# Patient Record
Sex: FEMALE | Race: WHITE | NOT HISPANIC OR LATINO
[De-identification: names, ages, dates, MRNs, and addresses within clinical notes are randomized per-mention and may not be internally consistent; named-entity substitution may affect disease eponyms.]

---

## 2017-03-17 ENCOUNTER — TRANSCRIPTION ENCOUNTER (OUTPATIENT)
Age: 32
End: 2017-03-17

## 2017-04-15 ENCOUNTER — TRANSCRIPTION ENCOUNTER (OUTPATIENT)
Age: 32
End: 2017-04-15

## 2017-11-09 ENCOUNTER — RESULT REVIEW (OUTPATIENT)
Age: 32
End: 2017-11-09

## 2017-11-10 ENCOUNTER — OUTPATIENT (OUTPATIENT)
Dept: OUTPATIENT SERVICES | Facility: HOSPITAL | Age: 32
LOS: 1 days | Discharge: HOME | End: 2017-11-10

## 2017-11-10 DIAGNOSIS — Z01.411 ENCOUNTER FOR GYNECOLOGICAL EXAMINATION (GENERAL) (ROUTINE) WITH ABNORMAL FINDINGS: ICD-10-CM

## 2018-02-19 ENCOUNTER — EMERGENCY (EMERGENCY)
Facility: HOSPITAL | Age: 33
LOS: 0 days | Discharge: HOME | End: 2018-02-19
Attending: EMERGENCY MEDICINE | Admitting: EMERGENCY MEDICINE
Payer: OTHER MISCELLANEOUS

## 2018-02-19 VITALS — SYSTOLIC BLOOD PRESSURE: 130 MMHG | DIASTOLIC BLOOD PRESSURE: 113 MMHG

## 2018-02-19 VITALS — WEIGHT: 293 LBS

## 2018-02-19 DIAGNOSIS — M25.561 PAIN IN RIGHT KNEE: ICD-10-CM

## 2018-02-19 DIAGNOSIS — R29.6 REPEATED FALLS: ICD-10-CM

## 2018-02-19 LAB — GLUCOSE BLDC GLUCOMTR-MCNC: 145 MG/DL — HIGH (ref 70–99)

## 2018-02-19 PROCEDURE — 99283 EMERGENCY DEPT VISIT LOW MDM: CPT

## 2018-02-19 PROCEDURE — 93010 ELECTROCARDIOGRAM REPORT: CPT

## 2018-02-19 PROCEDURE — 73564 X-RAY EXAM KNEE 4 OR MORE: CPT | Mod: 26,LT

## 2018-02-19 RX ORDER — IBUPROFEN 200 MG
600 TABLET ORAL ONCE
Qty: 0 | Refills: 0 | Status: COMPLETED | OUTPATIENT
Start: 2018-02-19 | End: 2018-02-19

## 2018-02-19 RX ADMIN — Medication 600 MILLIGRAM(S): at 11:12

## 2018-02-19 NOTE — ED STATDOCS - PROGRESS NOTE DETAILS
Pt with some pain relief with motrin, states only has pain with deep palpation, minimal to no pain with flexion/extension of knee and walking, pt stable for dc

## 2018-02-19 NOTE — ED STATDOCS - NS_ ATTENDINGSCRIBEDETAILS _ED_A_ED_FT
Madi Benson DO (Attending): The history, relevant review of systems, past medical and surgical history, medical decision making, and physical examination was documented by the scribe in my presence and I attest to the accuracy of the documentation.

## 2018-02-19 NOTE — ED ADULT TRIAGE NOTE - CHIEF COMPLAINT QUOTE
pt states she had mechincal slip and fall on water in hallway, falling onto left knee, pt c/o left knee pain, pt denies hitting head no LOC, no anticoagulation therapy.

## 2018-02-19 NOTE — ED ADULT NURSE NOTE - CHPI ED SYMPTOMS NEG
no bleeding/no weakness/no confusion/no numbness/no tingling/no fever/no abrasion/no deformity/no loss of consciousness/no vomiting

## 2018-02-19 NOTE — ED STATDOCS - OBJECTIVE STATEMENT
33 y/o female presents to the ED s/p fall earlier today c/o left knee pain. Pt states she slipped due to water on the floor. Pt states she fell on the same knee two weeks ago. Denies head trauma, NVD, fever. On metformin 3x a day. 31 y/o female presents to the ED s/p fall earlier today c/o left knee pain. Pt states she slipped due to water on the floor. Pt states she fell on the same knee two weeks ago. Denies head trauma, no LOC, no NVD, fever. On metformin 3x a day.  No other complaints.  Ambulatory in ED without assistance.

## 2018-02-19 NOTE — ED STATDOCS - ATTENDING CONTRIBUTION TO CARE
I, Madi Benson DO,  performed the initial face to face bedside interview with this patient regarding history of present illness, review of symptoms and relevant past medical, social and family history.  I completed an independent physical examination.  I was the initial provider who evaluated this patient. I have signed out the follow up of any pending tests (i.e. labs, radiological studies) to the ACP.  I have communicated the patient’s plan of care and disposition with the ACP. I, Madi Benson DO,  performed the initial face to face bedside interview with this patient regarding history of present illness, review of symptoms and relevant past medical, social and family history.  I completed an independent physical examination.  I was the initial provider who evaluated this patient. I have discussed pending tests (i.e. labs, radiological studies) with the Resident.  I agree with the patient’s plan of care and disposition as noted by the Resident.

## 2018-02-19 NOTE — ED STATDOCS - MUSCULOSKELETAL, MLM
range of motion is not limited. +TTP proximal anterior tibia left knee. TTP along patellar tendon. No ecchymosis. No deformity. No crepitus. Negative valgus/ varus. Negative draw. Negative sag.

## 2018-04-06 ENCOUNTER — OUTPATIENT (OUTPATIENT)
Dept: OUTPATIENT SERVICES | Facility: HOSPITAL | Age: 33
LOS: 1 days | End: 2018-04-06
Payer: COMMERCIAL

## 2018-04-06 ENCOUNTER — APPOINTMENT (OUTPATIENT)
Dept: ULTRASOUND IMAGING | Facility: CLINIC | Age: 33
End: 2018-04-06
Payer: COMMERCIAL

## 2018-04-06 DIAGNOSIS — Z00.8 ENCOUNTER FOR OTHER GENERAL EXAMINATION: ICD-10-CM

## 2018-04-06 PROCEDURE — 76536 US EXAM OF HEAD AND NECK: CPT | Mod: 26

## 2018-04-06 PROCEDURE — 76536 US EXAM OF HEAD AND NECK: CPT

## 2018-05-22 ENCOUNTER — OUTPATIENT (OUTPATIENT)
Dept: OUTPATIENT SERVICES | Facility: HOSPITAL | Age: 33
LOS: 1 days | Discharge: HOME | End: 2018-05-22
Payer: COMMERCIAL

## 2018-05-22 DIAGNOSIS — Z33.3 PREGNANT STATE, GESTATIONAL CARRIER: ICD-10-CM

## 2018-05-22 PROCEDURE — 83020 HEMOGLOBIN ELECTROPHORESIS: CPT | Mod: 26

## 2018-06-05 ENCOUNTER — OUTPATIENT (OUTPATIENT)
Dept: OUTPATIENT SERVICES | Facility: HOSPITAL | Age: 33
LOS: 1 days | Discharge: HOME | End: 2018-06-05

## 2018-06-05 DIAGNOSIS — Z33.3 PREGNANT STATE, GESTATIONAL CARRIER: ICD-10-CM

## 2018-06-09 LAB — VZV DNA, PCR RESULT: NEGATIVE — SIGNIFICANT CHANGE UP

## 2018-06-27 ENCOUNTER — APPOINTMENT (OUTPATIENT)
Dept: ANTEPARTUM | Facility: CLINIC | Age: 33
End: 2018-06-27
Payer: COMMERCIAL

## 2018-06-27 ENCOUNTER — ASOB RESULT (OUTPATIENT)
Age: 33
End: 2018-06-27

## 2018-06-27 PROCEDURE — 76813 OB US NUCHAL MEAS 1 GEST: CPT

## 2018-06-27 PROCEDURE — 36416 COLLJ CAPILLARY BLOOD SPEC: CPT

## 2018-06-27 PROCEDURE — 76801 OB US < 14 WKS SINGLE FETUS: CPT

## 2018-08-14 ENCOUNTER — ASOB RESULT (OUTPATIENT)
Age: 33
End: 2018-08-14

## 2018-08-14 ENCOUNTER — APPOINTMENT (OUTPATIENT)
Dept: ANTEPARTUM | Facility: CLINIC | Age: 33
End: 2018-08-14
Payer: COMMERCIAL

## 2018-08-14 PROCEDURE — 76817 TRANSVAGINAL US OBSTETRIC: CPT

## 2018-08-14 PROCEDURE — 76811 OB US DETAILED SNGL FETUS: CPT

## 2018-08-21 ENCOUNTER — APPOINTMENT (OUTPATIENT)
Dept: ANTEPARTUM | Facility: CLINIC | Age: 33
End: 2018-08-21
Payer: COMMERCIAL

## 2018-08-21 ENCOUNTER — ASOB RESULT (OUTPATIENT)
Age: 33
End: 2018-08-21

## 2018-08-21 PROCEDURE — 76816 OB US FOLLOW-UP PER FETUS: CPT

## 2018-09-11 ENCOUNTER — ASOB RESULT (OUTPATIENT)
Age: 33
End: 2018-09-11

## 2018-09-11 ENCOUNTER — APPOINTMENT (OUTPATIENT)
Dept: ANTEPARTUM | Facility: CLINIC | Age: 33
End: 2018-09-11
Payer: COMMERCIAL

## 2018-09-11 PROCEDURE — 76816 OB US FOLLOW-UP PER FETUS: CPT

## 2018-09-12 ENCOUNTER — ASOB RESULT (OUTPATIENT)
Age: 33
End: 2018-09-12

## 2018-09-12 ENCOUNTER — APPOINTMENT (OUTPATIENT)
Dept: ANTEPARTUM | Facility: CLINIC | Age: 33
End: 2018-09-12
Payer: COMMERCIAL

## 2018-09-12 PROCEDURE — 76827 ECHO EXAM OF FETAL HEART: CPT

## 2018-09-12 PROCEDURE — 76825 ECHO EXAM OF FETAL HEART: CPT

## 2018-09-26 ENCOUNTER — OUTPATIENT (OUTPATIENT)
Dept: OUTPATIENT SERVICES | Facility: HOSPITAL | Age: 33
LOS: 1 days | Discharge: HOME | End: 2018-09-26

## 2018-09-26 DIAGNOSIS — D64.9 ANEMIA, UNSPECIFIED: ICD-10-CM

## 2018-09-26 DIAGNOSIS — Z3A.25 25 WEEKS GESTATION OF PREGNANCY: ICD-10-CM

## 2018-10-01 ENCOUNTER — APPOINTMENT (OUTPATIENT)
Dept: OBGYN | Facility: CLINIC | Age: 33
End: 2018-10-01
Payer: COMMERCIAL

## 2018-10-01 VITALS — BODY MASS INDEX: 56.75 KG/M2 | HEIGHT: 65 IN

## 2018-10-01 VITALS — TEMPERATURE: 97.8 F | DIASTOLIC BLOOD PRESSURE: 90 MMHG | SYSTOLIC BLOOD PRESSURE: 140 MMHG | WEIGHT: 293 LBS

## 2018-10-01 DIAGNOSIS — Z20.2 CONTACT WITH AND (SUSPECTED) EXPOSURE TO INFECTIONS WITH A PREDOMINANTLY SEXUAL MODE OF TRANSMISSION: ICD-10-CM

## 2018-10-01 DIAGNOSIS — Z78.9 OTHER SPECIFIED HEALTH STATUS: ICD-10-CM

## 2018-10-01 PROCEDURE — 99203 OFFICE O/P NEW LOW 30 MIN: CPT

## 2018-10-02 PROBLEM — Z20.2 HPV EXPOSURE: Status: RESOLVED | Noted: 2018-10-01 | Resolved: 2018-10-02

## 2018-10-02 PROBLEM — Z78.9 NON-SMOKER: Status: ACTIVE | Noted: 2018-10-01

## 2018-10-04 ENCOUNTER — NON-APPOINTMENT (OUTPATIENT)
Age: 33
End: 2018-10-04

## 2018-10-04 ENCOUNTER — APPOINTMENT (OUTPATIENT)
Dept: OBGYN | Facility: CLINIC | Age: 33
End: 2018-10-04
Payer: COMMERCIAL

## 2018-10-04 VITALS
HEIGHT: 65 IN | BODY MASS INDEX: 48.82 KG/M2 | DIASTOLIC BLOOD PRESSURE: 82 MMHG | SYSTOLIC BLOOD PRESSURE: 140 MMHG | WEIGHT: 293 LBS

## 2018-10-04 PROCEDURE — 0501F PRENATAL FLOW SHEET: CPT

## 2018-10-08 LAB
BILIRUB UR QL STRIP: NORMAL
GLUCOSE UR-MCNC: NORMAL
HCG UR QL: 1 EU/DL
HGB UR QL STRIP.AUTO: NORMAL
KETONES UR-MCNC: NORMAL
LEUKOCYTE ESTERASE UR QL STRIP: NORMAL
NITRITE UR QL STRIP: NORMAL
PH UR STRIP: 6
PROT UR STRIP-MCNC: NORMAL
SP GR UR STRIP: 1.02

## 2018-10-09 ENCOUNTER — APPOINTMENT (OUTPATIENT)
Dept: ANTEPARTUM | Facility: CLINIC | Age: 33
End: 2018-10-09

## 2018-10-10 ENCOUNTER — APPOINTMENT (OUTPATIENT)
Dept: ANTEPARTUM | Facility: CLINIC | Age: 33
End: 2018-10-10
Payer: COMMERCIAL

## 2018-10-10 ENCOUNTER — ASOB RESULT (OUTPATIENT)
Age: 33
End: 2018-10-10

## 2018-10-10 PROCEDURE — 99213 OFFICE O/P EST LOW 20 MIN: CPT | Mod: 25

## 2018-10-10 PROCEDURE — 76816 OB US FOLLOW-UP PER FETUS: CPT

## 2018-10-10 PROCEDURE — 76820 UMBILICAL ARTERY ECHO: CPT

## 2018-10-11 ENCOUNTER — RX RENEWAL (OUTPATIENT)
Age: 33
End: 2018-10-11

## 2018-10-15 ENCOUNTER — NON-APPOINTMENT (OUTPATIENT)
Age: 33
End: 2018-10-15

## 2018-10-15 ENCOUNTER — APPOINTMENT (OUTPATIENT)
Dept: OBGYN | Facility: CLINIC | Age: 33
End: 2018-10-15
Payer: COMMERCIAL

## 2018-10-15 VITALS
DIASTOLIC BLOOD PRESSURE: 84 MMHG | HEIGHT: 65 IN | SYSTOLIC BLOOD PRESSURE: 140 MMHG | WEIGHT: 293 LBS | BODY MASS INDEX: 48.82 KG/M2

## 2018-10-15 PROCEDURE — 0502F SUBSEQUENT PRENATAL CARE: CPT

## 2018-10-15 PROCEDURE — 81003 URINALYSIS AUTO W/O SCOPE: CPT | Mod: QW

## 2018-10-15 PROCEDURE — 99213 OFFICE O/P EST LOW 20 MIN: CPT

## 2018-10-15 PROCEDURE — 82962 GLUCOSE BLOOD TEST: CPT

## 2018-10-17 LAB
BILIRUB UR QL STRIP: NORMAL
CLARITY UR: CLEAR
COLLECTION METHOD: NORMAL
GLUCOSE BLDC GLUCOMTR-MCNC: NORMAL
GLUCOSE UR-MCNC: NORMAL
HCG UR QL: 1 EU/DL
HGB UR QL STRIP.AUTO: NORMAL
KETONES UR-MCNC: NORMAL
LEUKOCYTE ESTERASE UR QL STRIP: NORMAL
NITRITE UR QL STRIP: NORMAL
PH UR STRIP: 7
PROT UR STRIP-MCNC: NORMAL
SP GR UR STRIP: 1.01

## 2018-10-19 ENCOUNTER — NON-APPOINTMENT (OUTPATIENT)
Age: 33
End: 2018-10-19

## 2018-10-26 ENCOUNTER — APPOINTMENT (OUTPATIENT)
Dept: OBGYN | Facility: CLINIC | Age: 33
End: 2018-10-26
Payer: COMMERCIAL

## 2018-10-26 VITALS — BODY MASS INDEX: 48.82 KG/M2 | HEIGHT: 65 IN | WEIGHT: 293 LBS

## 2018-10-26 VITALS — SYSTOLIC BLOOD PRESSURE: 140 MMHG | DIASTOLIC BLOOD PRESSURE: 81 MMHG

## 2018-10-26 PROCEDURE — 99212 OFFICE O/P EST SF 10 MIN: CPT

## 2018-10-26 PROCEDURE — 0502F SUBSEQUENT PRENATAL CARE: CPT

## 2018-11-06 ENCOUNTER — ASOB RESULT (OUTPATIENT)
Age: 33
End: 2018-11-06

## 2018-11-06 ENCOUNTER — APPOINTMENT (OUTPATIENT)
Dept: ANTEPARTUM | Facility: CLINIC | Age: 33
End: 2018-11-06
Payer: COMMERCIAL

## 2018-11-06 PROCEDURE — 76816 OB US FOLLOW-UP PER FETUS: CPT

## 2018-11-06 PROCEDURE — 76819 FETAL BIOPHYS PROFIL W/O NST: CPT

## 2018-11-09 ENCOUNTER — NON-APPOINTMENT (OUTPATIENT)
Age: 33
End: 2018-11-09

## 2018-11-09 ENCOUNTER — APPOINTMENT (OUTPATIENT)
Dept: OBGYN | Facility: CLINIC | Age: 33
End: 2018-11-09
Payer: COMMERCIAL

## 2018-11-09 VITALS
HEIGHT: 65 IN | TEMPERATURE: 98.7 F | DIASTOLIC BLOOD PRESSURE: 88 MMHG | SYSTOLIC BLOOD PRESSURE: 132 MMHG | WEIGHT: 293 LBS | BODY MASS INDEX: 48.82 KG/M2

## 2018-11-09 VITALS — SYSTOLIC BLOOD PRESSURE: 160 MMHG | DIASTOLIC BLOOD PRESSURE: 90 MMHG

## 2018-11-09 LAB
BILIRUB UR QL STRIP: NORMAL
CLARITY UR: CLEAR
COLLECTION METHOD: NORMAL
GLUCOSE UR-MCNC: NORMAL
HCG UR QL: 1 EU/DL
HGB UR QL STRIP.AUTO: NORMAL
KETONES UR-MCNC: NORMAL
LEUKOCYTE ESTERASE UR QL STRIP: NORMAL
NITRITE UR QL STRIP: NORMAL
PH UR STRIP: 6
PROT UR STRIP-MCNC: NORMAL
SP GR UR STRIP: 1.02

## 2018-11-09 PROCEDURE — 59025 FETAL NON-STRESS TEST: CPT

## 2018-11-09 PROCEDURE — 0502F SUBSEQUENT PRENATAL CARE: CPT | Mod: 25

## 2018-11-09 PROCEDURE — 81003 URINALYSIS AUTO W/O SCOPE: CPT | Mod: QW

## 2018-11-09 RX ORDER — METRONIDAZOLE 7.5 MG/G
0.75 GEL VAGINAL
Qty: 70 | Refills: 0 | Status: COMPLETED | COMMUNITY
Start: 2018-06-06 | End: 2018-11-09

## 2018-11-09 RX ORDER — TERCONAZOLE 4 MG/G
0.4 CREAM VAGINAL
Qty: 45 | Refills: 0 | Status: COMPLETED | COMMUNITY
Start: 2018-06-06 | End: 2018-11-09

## 2018-11-13 ENCOUNTER — APPOINTMENT (OUTPATIENT)
Dept: ANTEPARTUM | Facility: CLINIC | Age: 33
End: 2018-11-13
Payer: COMMERCIAL

## 2018-11-13 ENCOUNTER — ASOB RESULT (OUTPATIENT)
Age: 33
End: 2018-11-13

## 2018-11-13 LAB
BASOPHILS # BLD AUTO: 0.04 K/UL
BASOPHILS NFR BLD AUTO: 0.3 %
EOSINOPHIL # BLD AUTO: 0.15 K/UL
EOSINOPHIL NFR BLD AUTO: 1.2 %
HCT VFR BLD CALC: 33.4 %
HGB BLD-MCNC: 10.5 G/DL
IMM GRANULOCYTES NFR BLD AUTO: 0.4 %
LYMPHOCYTES # BLD AUTO: 1.68 K/UL
LYMPHOCYTES NFR BLD AUTO: 13.7 %
MAN DIFF?: NORMAL
MCHC RBC-ENTMCNC: 24.5 PG
MCHC RBC-ENTMCNC: 31.4 GM/DL
MCV RBC AUTO: 78 FL
MONOCYTES # BLD AUTO: 0.89 K/UL
MONOCYTES NFR BLD AUTO: 7.3 %
NEUTROPHILS # BLD AUTO: 9.42 K/UL
NEUTROPHILS NFR BLD AUTO: 77.1 %
PLATELET # BLD AUTO: 262 K/UL
RBC # BLD: 4.28 M/UL
RBC # FLD: 14.6 %
WBC # FLD AUTO: 12.23 K/UL

## 2018-11-13 PROCEDURE — 76818 FETAL BIOPHYS PROFILE W/NST: CPT

## 2018-11-14 LAB
ALBUMIN SERPL ELPH-MCNC: 3.6 G/DL
ALP BLD-CCNC: 89 U/L
ALT SERPL-CCNC: 14 U/L
ANION GAP SERPL CALC-SCNC: 12 MMOL/L
AST SERPL-CCNC: 17 U/L
BILIRUB SERPL-MCNC: 0.2 MG/DL
BUN SERPL-MCNC: 6 MG/DL
CALCIUM SERPL-MCNC: 9.2 MG/DL
CHLORIDE SERPL-SCNC: 101 MMOL/L
CO2 SERPL-SCNC: 23 MMOL/L
CREAT SERPL-MCNC: 0.35 MG/DL
CREAT SPEC-SCNC: 17 MG/DL
CREAT/PROT UR: NORMAL
GLUCOSE SERPL-MCNC: 98 MG/DL
POTASSIUM SERPL-SCNC: 4.1 MMOL/L
PROT SERPL-MCNC: 6.6 G/DL
PROT UR-MCNC: <4 MG/DL
SODIUM SERPL-SCNC: 136 MMOL/L
URATE SERPL-MCNC: 3.4 MG/DL

## 2018-11-16 ENCOUNTER — APPOINTMENT (OUTPATIENT)
Dept: OBGYN | Facility: CLINIC | Age: 33
End: 2018-11-16
Payer: COMMERCIAL

## 2018-11-16 VITALS
BODY MASS INDEX: 48.82 KG/M2 | SYSTOLIC BLOOD PRESSURE: 130 MMHG | HEIGHT: 65 IN | DIASTOLIC BLOOD PRESSURE: 80 MMHG | WEIGHT: 293 LBS

## 2018-11-16 PROCEDURE — 0502F SUBSEQUENT PRENATAL CARE: CPT

## 2018-11-16 PROCEDURE — 81003 URINALYSIS AUTO W/O SCOPE: CPT | Mod: QW

## 2018-11-16 PROCEDURE — 59025 FETAL NON-STRESS TEST: CPT

## 2018-11-19 ENCOUNTER — NON-APPOINTMENT (OUTPATIENT)
Age: 33
End: 2018-11-19

## 2018-11-19 LAB
BILIRUB UR QL STRIP: NORMAL
CLARITY UR: CLEAR
COLLECTION METHOD: NORMAL
GLUCOSE UR-MCNC: NORMAL
HCG UR QL: 0.2 EU/DL
HGB UR QL STRIP.AUTO: NORMAL
KETONES UR-MCNC: NORMAL
LEUKOCYTE ESTERASE UR QL STRIP: NORMAL
NITRITE UR QL STRIP: NORMAL
PH UR STRIP: 6
PROT UR STRIP-MCNC: NORMAL
SP GR UR STRIP: 1.02

## 2018-11-20 ENCOUNTER — APPOINTMENT (OUTPATIENT)
Dept: ANTEPARTUM | Facility: CLINIC | Age: 33
End: 2018-11-20
Payer: COMMERCIAL

## 2018-11-20 ENCOUNTER — ASOB RESULT (OUTPATIENT)
Age: 33
End: 2018-11-20

## 2018-11-20 PROCEDURE — 76818 FETAL BIOPHYS PROFILE W/NST: CPT

## 2018-11-21 ENCOUNTER — APPOINTMENT (OUTPATIENT)
Dept: OBGYN | Facility: CLINIC | Age: 33
End: 2018-11-21

## 2018-11-27 ENCOUNTER — APPOINTMENT (OUTPATIENT)
Dept: ANTEPARTUM | Facility: CLINIC | Age: 33
End: 2018-11-27
Payer: COMMERCIAL

## 2018-11-27 ENCOUNTER — ASOB RESULT (OUTPATIENT)
Age: 33
End: 2018-11-27

## 2018-11-27 PROCEDURE — 76818 FETAL BIOPHYS PROFILE W/NST: CPT

## 2018-11-30 ENCOUNTER — APPOINTMENT (OUTPATIENT)
Dept: OBGYN | Facility: CLINIC | Age: 33
End: 2018-11-30

## 2018-12-04 ENCOUNTER — APPOINTMENT (OUTPATIENT)
Dept: ANTEPARTUM | Facility: CLINIC | Age: 33
End: 2018-12-04
Payer: COMMERCIAL

## 2018-12-04 ENCOUNTER — ASOB RESULT (OUTPATIENT)
Age: 33
End: 2018-12-04

## 2018-12-04 PROCEDURE — 76818 FETAL BIOPHYS PROFILE W/NST: CPT

## 2018-12-04 PROCEDURE — 76816 OB US FOLLOW-UP PER FETUS: CPT

## 2018-12-08 ENCOUNTER — NON-APPOINTMENT (OUTPATIENT)
Age: 33
End: 2018-12-08

## 2018-12-08 ENCOUNTER — APPOINTMENT (OUTPATIENT)
Dept: OBGYN | Facility: CLINIC | Age: 33
End: 2018-12-08
Payer: COMMERCIAL

## 2018-12-08 VITALS
BODY MASS INDEX: 48.82 KG/M2 | DIASTOLIC BLOOD PRESSURE: 80 MMHG | HEIGHT: 65 IN | SYSTOLIC BLOOD PRESSURE: 138 MMHG | TEMPERATURE: 98 F | WEIGHT: 293 LBS

## 2018-12-08 LAB
BILIRUB UR QL STRIP: NORMAL
CLARITY UR: CLEAR
COLLECTION METHOD: NORMAL
GLUCOSE UR-MCNC: NORMAL
HCG UR QL: 0.2 EU/DL
HGB UR QL STRIP.AUTO: NORMAL
KETONES UR-MCNC: NORMAL
LEUKOCYTE ESTERASE UR QL STRIP: NORMAL
NITRITE UR QL STRIP: NORMAL
PH UR STRIP: 7
PROT UR STRIP-MCNC: NORMAL
SP GR UR STRIP: 1.01

## 2018-12-08 PROCEDURE — 81003 URINALYSIS AUTO W/O SCOPE: CPT | Mod: QW

## 2018-12-08 PROCEDURE — 59025 FETAL NON-STRESS TEST: CPT

## 2018-12-08 PROCEDURE — 0502F SUBSEQUENT PRENATAL CARE: CPT

## 2018-12-11 ENCOUNTER — ASOB RESULT (OUTPATIENT)
Age: 33
End: 2018-12-11

## 2018-12-11 ENCOUNTER — APPOINTMENT (OUTPATIENT)
Dept: ANTEPARTUM | Facility: CLINIC | Age: 33
End: 2018-12-11
Payer: COMMERCIAL

## 2018-12-11 PROCEDURE — 76818 FETAL BIOPHYS PROFILE W/NST: CPT

## 2018-12-14 ENCOUNTER — OTHER (OUTPATIENT)
Age: 33
End: 2018-12-14

## 2018-12-14 ENCOUNTER — APPOINTMENT (OUTPATIENT)
Dept: OBGYN | Facility: CLINIC | Age: 33
End: 2018-12-14
Payer: COMMERCIAL

## 2018-12-14 ENCOUNTER — NON-APPOINTMENT (OUTPATIENT)
Age: 33
End: 2018-12-14

## 2018-12-14 VITALS
DIASTOLIC BLOOD PRESSURE: 100 MMHG | TEMPERATURE: 97.6 F | HEIGHT: 65 IN | WEIGHT: 293 LBS | BODY MASS INDEX: 48.82 KG/M2 | SYSTOLIC BLOOD PRESSURE: 150 MMHG

## 2018-12-14 VITALS — SYSTOLIC BLOOD PRESSURE: 144 MMHG | DIASTOLIC BLOOD PRESSURE: 88 MMHG

## 2018-12-14 LAB
BILIRUB UR QL STRIP: NORMAL
CLARITY UR: CLEAR
COLLECTION METHOD: NORMAL
GLUCOSE UR-MCNC: NORMAL
HCG UR QL: 0.2 EU/DL
HGB UR QL STRIP.AUTO: NORMAL
KETONES UR-MCNC: NORMAL
LEUKOCYTE ESTERASE UR QL STRIP: NORMAL
NITRITE UR QL STRIP: NORMAL
PH UR STRIP: 6
PROT UR STRIP-MCNC: NORMAL
SP GR UR STRIP: 1.01

## 2018-12-14 PROCEDURE — 0502F SUBSEQUENT PRENATAL CARE: CPT

## 2018-12-14 PROCEDURE — 59025 FETAL NON-STRESS TEST: CPT

## 2018-12-18 LAB — B-HEM STREP SPEC QL CULT: ABNORMAL

## 2018-12-19 ENCOUNTER — ASOB RESULT (OUTPATIENT)
Age: 33
End: 2018-12-19

## 2018-12-19 ENCOUNTER — APPOINTMENT (OUTPATIENT)
Dept: ANTEPARTUM | Facility: CLINIC | Age: 33
End: 2018-12-19
Payer: COMMERCIAL

## 2018-12-19 PROCEDURE — 76818 FETAL BIOPHYS PROFILE W/NST: CPT

## 2018-12-21 ENCOUNTER — OUTPATIENT (OUTPATIENT)
Dept: OUTPATIENT SERVICES | Facility: HOSPITAL | Age: 33
LOS: 1 days | Discharge: HOME | End: 2018-12-21

## 2018-12-21 DIAGNOSIS — O34.211 MATERNAL CARE FOR LOW TRANSVERSE SCAR FROM PREVIOUS CESAREAN DELIVERY: ICD-10-CM

## 2018-12-21 LAB
BASOPHILS # BLD AUTO: 0.04 K/UL — SIGNIFICANT CHANGE UP (ref 0–0.2)
BASOPHILS NFR BLD AUTO: 0.4 % — SIGNIFICANT CHANGE UP (ref 0–2)
BLD GP AB SCN SERPL QL: SIGNIFICANT CHANGE UP
EOSINOPHIL # BLD AUTO: 0.09 K/UL — SIGNIFICANT CHANGE UP (ref 0–0.5)
EOSINOPHIL NFR BLD AUTO: 0.8 % — SIGNIFICANT CHANGE UP (ref 0–6)
HCT VFR BLD CALC: 31.9 % — LOW (ref 34.5–45)
HGB BLD-MCNC: 10.4 G/DL — LOW (ref 11.5–15.5)
IMM GRANULOCYTES NFR BLD AUTO: 0.5 % — SIGNIFICANT CHANGE UP (ref 0–1.5)
LYMPHOCYTES # BLD AUTO: 1.64 K/UL — SIGNIFICANT CHANGE UP (ref 1–3.3)
LYMPHOCYTES # BLD AUTO: 15.1 % — SIGNIFICANT CHANGE UP (ref 13–44)
MCHC RBC-ENTMCNC: 24.8 PG — LOW (ref 27–34)
MCHC RBC-ENTMCNC: 32.6 GM/DL — SIGNIFICANT CHANGE UP (ref 32–36)
MCV RBC AUTO: 76 FL — LOW (ref 80–100)
MONOCYTES # BLD AUTO: 0.77 K/UL — SIGNIFICANT CHANGE UP (ref 0–0.9)
MONOCYTES NFR BLD AUTO: 7.1 % — SIGNIFICANT CHANGE UP (ref 2–14)
NEUTROPHILS # BLD AUTO: 8.25 K/UL — HIGH (ref 1.8–7.4)
NEUTROPHILS NFR BLD AUTO: 76.1 % — SIGNIFICANT CHANGE UP (ref 43–77)
NRBC # BLD: 0 /100 WBCS — SIGNIFICANT CHANGE UP (ref 0–0)
PLATELET # BLD AUTO: 239 K/UL — SIGNIFICANT CHANGE UP (ref 150–400)
RBC # BLD: 4.2 M/UL — SIGNIFICANT CHANGE UP (ref 3.8–5.2)
RBC # FLD: 14.9 % — HIGH (ref 10.3–14.5)
TYPE + AB SCN PNL BLD: SIGNIFICANT CHANGE UP
WBC # BLD: 10.84 K/UL — HIGH (ref 3.8–10.5)
WBC # FLD AUTO: 10.84 K/UL — HIGH (ref 3.8–10.5)

## 2018-12-22 ENCOUNTER — RESULT REVIEW (OUTPATIENT)
Age: 33
End: 2018-12-22

## 2018-12-22 ENCOUNTER — INPATIENT (INPATIENT)
Facility: HOSPITAL | Age: 33
LOS: 2 days | Discharge: HOME | End: 2018-12-25
Attending: OBSTETRICS & GYNECOLOGY | Admitting: OBSTETRICS & GYNECOLOGY
Payer: COMMERCIAL

## 2018-12-22 VITALS — WEIGHT: 293 LBS | HEIGHT: 65 IN

## 2018-12-22 LAB
ALBUMIN SERPL ELPH-MCNC: 2.7 G/DL — LOW (ref 3.3–5)
ALP SERPL-CCNC: 92 U/L — SIGNIFICANT CHANGE UP (ref 40–120)
ALT FLD-CCNC: 24 U/L — SIGNIFICANT CHANGE UP (ref 12–78)
ANION GAP SERPL CALC-SCNC: 8 MMOL/L — SIGNIFICANT CHANGE UP (ref 5–17)
APTT BLD: 29.2 SEC — SIGNIFICANT CHANGE UP (ref 27.5–36.3)
AST SERPL-CCNC: 23 U/L — SIGNIFICANT CHANGE UP (ref 15–37)
BASOPHILS # BLD AUTO: 0.05 K/UL — SIGNIFICANT CHANGE UP (ref 0–0.2)
BASOPHILS NFR BLD AUTO: 0.4 % — SIGNIFICANT CHANGE UP (ref 0–2)
BILIRUB SERPL-MCNC: 0.3 MG/DL — SIGNIFICANT CHANGE UP (ref 0.2–1.2)
BUN SERPL-MCNC: 11 MG/DL — SIGNIFICANT CHANGE UP (ref 7–23)
CALCIUM SERPL-MCNC: 9 MG/DL — SIGNIFICANT CHANGE UP (ref 8.5–10.1)
CHLORIDE SERPL-SCNC: 108 MMOL/L — SIGNIFICANT CHANGE UP (ref 96–108)
CO2 SERPL-SCNC: 22 MMOL/L — SIGNIFICANT CHANGE UP (ref 22–31)
CREAT ?TM UR-MCNC: 68 MG/DL — SIGNIFICANT CHANGE UP
CREAT SERPL-MCNC: 0.47 MG/DL — LOW (ref 0.5–1.3)
EOSINOPHIL # BLD AUTO: 0.13 K/UL — SIGNIFICANT CHANGE UP (ref 0–0.5)
EOSINOPHIL NFR BLD AUTO: 1.1 % — SIGNIFICANT CHANGE UP (ref 0–6)
FIBRINOGEN PPP-MCNC: 828 MG/DL — HIGH (ref 330–470)
GLUCOSE BLDC GLUCOMTR-MCNC: 107 MG/DL — HIGH (ref 70–99)
GLUCOSE BLDC GLUCOMTR-MCNC: 98 MG/DL — SIGNIFICANT CHANGE UP (ref 70–99)
GLUCOSE SERPL-MCNC: 85 MG/DL — SIGNIFICANT CHANGE UP (ref 70–99)
HBV SURFACE AG SER-ACNC: SIGNIFICANT CHANGE UP
HCT VFR BLD CALC: 33.5 % — LOW (ref 34.5–45)
HGB BLD-MCNC: 10.6 G/DL — LOW (ref 11.5–15.5)
HIV 1 & 2 AB SERPL IA.RAPID: SIGNIFICANT CHANGE UP
IMM GRANULOCYTES NFR BLD AUTO: 0.4 % — SIGNIFICANT CHANGE UP (ref 0–1.5)
INR BLD: 0.94 RATIO — SIGNIFICANT CHANGE UP (ref 0.88–1.16)
LYMPHOCYTES # BLD AUTO: 1.75 K/UL — SIGNIFICANT CHANGE UP (ref 1–3.3)
LYMPHOCYTES # BLD AUTO: 14.4 % — SIGNIFICANT CHANGE UP (ref 13–44)
MAGNESIUM SERPL-MCNC: 3.4 MG/DL — HIGH (ref 1.6–2.6)
MCHC RBC-ENTMCNC: 24.5 PG — LOW (ref 27–34)
MCHC RBC-ENTMCNC: 31.6 GM/DL — LOW (ref 32–36)
MCV RBC AUTO: 77.4 FL — LOW (ref 80–100)
MONOCYTES # BLD AUTO: 0.9 K/UL — SIGNIFICANT CHANGE UP (ref 0–0.9)
MONOCYTES NFR BLD AUTO: 7.4 % — SIGNIFICANT CHANGE UP (ref 2–14)
NEUTROPHILS # BLD AUTO: 9.29 K/UL — HIGH (ref 1.8–7.4)
NEUTROPHILS NFR BLD AUTO: 76.3 % — SIGNIFICANT CHANGE UP (ref 43–77)
PLATELET # BLD AUTO: 246 K/UL — SIGNIFICANT CHANGE UP (ref 150–400)
POTASSIUM SERPL-MCNC: 3.8 MMOL/L — SIGNIFICANT CHANGE UP (ref 3.5–5.3)
POTASSIUM SERPL-SCNC: 3.8 MMOL/L — SIGNIFICANT CHANGE UP (ref 3.5–5.3)
PROT ?TM UR-MCNC: 11 MG/DL — SIGNIFICANT CHANGE UP (ref 0–12)
PROT SERPL-MCNC: 7.3 GM/DL — SIGNIFICANT CHANGE UP (ref 6–8.3)
PROT/CREAT UR-RTO: 0.2 RATIO — SIGNIFICANT CHANGE UP (ref 0–0.2)
PROTHROM AB SERPL-ACNC: 10.4 SEC — SIGNIFICANT CHANGE UP (ref 10–12.9)
RBC # BLD: 4.33 M/UL — SIGNIFICANT CHANGE UP (ref 3.8–5.2)
RBC # FLD: 14.9 % — HIGH (ref 10.3–14.5)
SODIUM SERPL-SCNC: 138 MMOL/L — SIGNIFICANT CHANGE UP (ref 135–145)
WBC # BLD: 12.17 K/UL — HIGH (ref 3.8–10.5)
WBC # FLD AUTO: 12.17 K/UL — HIGH (ref 3.8–10.5)

## 2018-12-22 PROCEDURE — 88307 TISSUE EXAM BY PATHOLOGIST: CPT | Mod: 26

## 2018-12-22 PROCEDURE — 59510 CESAREAN DELIVERY: CPT | Mod: U9

## 2018-12-22 RX ORDER — SODIUM CHLORIDE 9 MG/ML
1000 INJECTION, SOLUTION INTRAVENOUS
Qty: 0 | Refills: 0 | Status: DISCONTINUED | OUTPATIENT
Start: 2018-12-22 | End: 2018-12-22

## 2018-12-22 RX ORDER — CEFAZOLIN SODIUM 1 G
3000 VIAL (EA) INJECTION ONCE
Qty: 0 | Refills: 0 | Status: COMPLETED | OUTPATIENT
Start: 2018-12-22 | End: 2018-12-22

## 2018-12-22 RX ORDER — DIPHENHYDRAMINE HCL 50 MG
25 CAPSULE ORAL EVERY 6 HOURS
Qty: 0 | Refills: 0 | Status: DISCONTINUED | OUTPATIENT
Start: 2018-12-23 | End: 2018-12-25

## 2018-12-22 RX ORDER — CITRIC ACID/SODIUM CITRATE 300-500 MG
30 SOLUTION, ORAL ORAL ONCE
Qty: 0 | Refills: 0 | Status: COMPLETED | OUTPATIENT
Start: 2018-12-22 | End: 2018-12-22

## 2018-12-22 RX ORDER — MAGNESIUM SULFATE 500 MG/ML
2 VIAL (ML) INJECTION
Qty: 40 | Refills: 0 | Status: DISCONTINUED | OUTPATIENT
Start: 2018-12-22 | End: 2018-12-25

## 2018-12-22 RX ORDER — SIMETHICONE 80 MG/1
80 TABLET, CHEWABLE ORAL EVERY 4 HOURS
Qty: 0 | Refills: 0 | Status: DISCONTINUED | OUTPATIENT
Start: 2018-12-23 | End: 2018-12-25

## 2018-12-22 RX ORDER — IBUPROFEN 200 MG
600 TABLET ORAL EVERY 6 HOURS
Qty: 0 | Refills: 0 | Status: DISCONTINUED | OUTPATIENT
Start: 2018-12-23 | End: 2018-12-25

## 2018-12-22 RX ORDER — SODIUM CHLORIDE 9 MG/ML
1000 INJECTION, SOLUTION INTRAVENOUS
Qty: 0 | Refills: 0 | Status: DISCONTINUED | OUTPATIENT
Start: 2018-12-23 | End: 2018-12-25

## 2018-12-22 RX ORDER — SODIUM CHLORIDE 9 MG/ML
1000 INJECTION, SOLUTION INTRAVENOUS ONCE
Qty: 0 | Refills: 0 | Status: COMPLETED | OUTPATIENT
Start: 2018-12-22 | End: 2018-12-22

## 2018-12-22 RX ORDER — MORPHINE SULFATE 50 MG/1
0.15 CAPSULE, EXTENDED RELEASE ORAL ONCE
Qty: 0 | Refills: 0 | Status: DISCONTINUED | OUTPATIENT
Start: 2018-12-23 | End: 2018-12-25

## 2018-12-22 RX ORDER — METOCLOPRAMIDE HCL 10 MG
10 TABLET ORAL ONCE
Qty: 0 | Refills: 0 | Status: COMPLETED | OUTPATIENT
Start: 2018-12-22 | End: 2018-12-22

## 2018-12-22 RX ORDER — LABETALOL HCL 100 MG
40 TABLET ORAL ONCE
Qty: 0 | Refills: 0 | Status: COMPLETED | OUTPATIENT
Start: 2018-12-22 | End: 2018-12-22

## 2018-12-22 RX ORDER — OXYTOCIN 10 UNIT/ML
25 VIAL (ML) INJECTION
Qty: 20 | Refills: 0 | Status: DISCONTINUED | OUTPATIENT
Start: 2018-12-22 | End: 2018-12-25

## 2018-12-22 RX ORDER — MAGNESIUM SULFATE 500 MG/ML
4 VIAL (ML) INJECTION ONCE
Qty: 0 | Refills: 0 | Status: COMPLETED | OUTPATIENT
Start: 2018-12-22 | End: 2018-12-22

## 2018-12-22 RX ORDER — GLYCERIN ADULT
1 SUPPOSITORY, RECTAL RECTAL AT BEDTIME
Qty: 0 | Refills: 0 | Status: DISCONTINUED | OUTPATIENT
Start: 2018-12-23 | End: 2018-12-25

## 2018-12-22 RX ORDER — NALOXONE HYDROCHLORIDE 4 MG/.1ML
0.1 SPRAY NASAL
Qty: 0 | Refills: 0 | Status: DISCONTINUED | OUTPATIENT
Start: 2018-12-23 | End: 2018-12-25

## 2018-12-22 RX ORDER — OXYCODONE AND ACETAMINOPHEN 5; 325 MG/1; MG/1
1 TABLET ORAL
Qty: 0 | Refills: 0 | Status: DISCONTINUED | OUTPATIENT
Start: 2018-12-23 | End: 2018-12-25

## 2018-12-22 RX ORDER — OXYTOCIN 10 UNIT/ML
333.33 VIAL (ML) INJECTION
Qty: 20 | Refills: 0 | Status: DISCONTINUED | OUTPATIENT
Start: 2018-12-22 | End: 2018-12-25

## 2018-12-22 RX ORDER — HYDROMORPHONE HYDROCHLORIDE 2 MG/ML
0.5 INJECTION INTRAMUSCULAR; INTRAVENOUS; SUBCUTANEOUS
Qty: 0 | Refills: 0 | Status: DISCONTINUED | OUTPATIENT
Start: 2018-12-23 | End: 2018-12-25

## 2018-12-22 RX ORDER — LANOLIN
1 OINTMENT (GRAM) TOPICAL
Qty: 0 | Refills: 0 | Status: DISCONTINUED | OUTPATIENT
Start: 2018-12-23 | End: 2018-12-25

## 2018-12-22 RX ORDER — METFORMIN HYDROCHLORIDE 850 MG/1
1000 TABLET ORAL
Qty: 0 | Refills: 0 | Status: DISCONTINUED | OUTPATIENT
Start: 2018-12-22 | End: 2018-12-25

## 2018-12-22 RX ORDER — SODIUM CHLORIDE 9 MG/ML
1000 INJECTION, SOLUTION INTRAVENOUS
Qty: 0 | Refills: 0 | Status: DISCONTINUED | OUTPATIENT
Start: 2018-12-22 | End: 2018-12-25

## 2018-12-22 RX ORDER — OXYTOCIN 10 UNIT/ML
41.67 VIAL (ML) INJECTION
Qty: 20 | Refills: 0 | Status: DISCONTINUED | OUTPATIENT
Start: 2018-12-22 | End: 2018-12-22

## 2018-12-22 RX ORDER — ACETAMINOPHEN 500 MG
650 TABLET ORAL EVERY 6 HOURS
Qty: 0 | Refills: 0 | Status: DISCONTINUED | OUTPATIENT
Start: 2018-12-23 | End: 2018-12-25

## 2018-12-22 RX ORDER — LABETALOL HCL 100 MG
20 TABLET ORAL ONCE
Qty: 0 | Refills: 0 | Status: COMPLETED | OUTPATIENT
Start: 2018-12-22 | End: 2018-12-22

## 2018-12-22 RX ORDER — HEPARIN SODIUM 5000 [USP'U]/ML
5000 INJECTION INTRAVENOUS; SUBCUTANEOUS EVERY 12 HOURS
Qty: 0 | Refills: 0 | Status: DISCONTINUED | OUTPATIENT
Start: 2018-12-23 | End: 2018-12-23

## 2018-12-22 RX ORDER — DOCUSATE SODIUM 100 MG
100 CAPSULE ORAL
Qty: 0 | Refills: 0 | Status: DISCONTINUED | OUTPATIENT
Start: 2018-12-23 | End: 2018-12-25

## 2018-12-22 RX ORDER — HYDROMORPHONE HYDROCHLORIDE 2 MG/ML
2 INJECTION INTRAMUSCULAR; INTRAVENOUS; SUBCUTANEOUS
Qty: 0 | Refills: 0 | Status: DISCONTINUED | OUTPATIENT
Start: 2018-12-23 | End: 2018-12-25

## 2018-12-22 RX ORDER — OXYCODONE AND ACETAMINOPHEN 5; 325 MG/1; MG/1
2 TABLET ORAL EVERY 6 HOURS
Qty: 0 | Refills: 0 | Status: DISCONTINUED | OUTPATIENT
Start: 2018-12-23 | End: 2018-12-25

## 2018-12-22 RX ORDER — ACETAMINOPHEN 500 MG
1000 TABLET ORAL ONCE
Qty: 0 | Refills: 0 | Status: COMPLETED | OUTPATIENT
Start: 2018-12-22 | End: 2018-12-22

## 2018-12-22 RX ORDER — FERROUS SULFATE 325(65) MG
325 TABLET ORAL DAILY
Qty: 0 | Refills: 0 | Status: DISCONTINUED | OUTPATIENT
Start: 2018-12-23 | End: 2018-12-25

## 2018-12-22 RX ORDER — ONDANSETRON 8 MG/1
4 TABLET, FILM COATED ORAL EVERY 6 HOURS
Qty: 0 | Refills: 0 | Status: DISCONTINUED | OUTPATIENT
Start: 2018-12-22 | End: 2018-12-25

## 2018-12-22 RX ORDER — TETANUS TOXOID, REDUCED DIPHTHERIA TOXOID AND ACELLULAR PERTUSSIS VACCINE, ADSORBED 5; 2.5; 8; 8; 2.5 [IU]/.5ML; [IU]/.5ML; UG/.5ML; UG/.5ML; UG/.5ML
0.5 SUSPENSION INTRAMUSCULAR ONCE
Qty: 0 | Refills: 0 | Status: DISCONTINUED | OUTPATIENT
Start: 2018-12-23 | End: 2018-12-25

## 2018-12-22 RX ORDER — LABETALOL HCL 100 MG
200 TABLET ORAL THREE TIMES A DAY
Qty: 0 | Refills: 0 | Status: DISCONTINUED | OUTPATIENT
Start: 2018-12-22 | End: 2018-12-25

## 2018-12-22 RX ORDER — HEPARIN SODIUM 5000 [USP'U]/ML
5000 INJECTION INTRAVENOUS; SUBCUTANEOUS EVERY 12 HOURS
Qty: 0 | Refills: 0 | Status: DISCONTINUED | OUTPATIENT
Start: 2018-12-22 | End: 2018-12-22

## 2018-12-22 RX ORDER — ONDANSETRON 8 MG/1
2 TABLET, FILM COATED ORAL EVERY 6 HOURS
Qty: 0 | Refills: 0 | Status: DISCONTINUED | OUTPATIENT
Start: 2018-12-23 | End: 2018-12-25

## 2018-12-22 RX ADMIN — Medication 10 MILLIGRAM(S): at 16:25

## 2018-12-22 RX ADMIN — Medication 30 MILLILITER(S): at 09:20

## 2018-12-22 RX ADMIN — SODIUM CHLORIDE 75 MILLILITER(S): 9 INJECTION, SOLUTION INTRAVENOUS at 19:36

## 2018-12-22 RX ADMIN — Medication 200 MILLIGRAM(S): at 09:32

## 2018-12-22 RX ADMIN — HEPARIN SODIUM 5000 UNIT(S): 5000 INJECTION INTRAVENOUS; SUBCUTANEOUS at 23:00

## 2018-12-22 RX ADMIN — Medication 300 GRAM(S): at 13:25

## 2018-12-22 RX ADMIN — Medication 20 MILLIGRAM(S): at 11:58

## 2018-12-22 RX ADMIN — Medication 200 MILLIGRAM(S): at 13:30

## 2018-12-22 RX ADMIN — Medication 40 MILLIGRAM(S): at 12:34

## 2018-12-22 RX ADMIN — Medication 200 MILLIGRAM(S): at 22:07

## 2018-12-22 RX ADMIN — Medication 1000 MILLIGRAM(S): at 14:58

## 2018-12-22 RX ADMIN — ONDANSETRON 4 MILLIGRAM(S): 8 TABLET, FILM COATED ORAL at 14:15

## 2018-12-22 RX ADMIN — Medication 50 GM/HR: at 13:47

## 2018-12-22 RX ADMIN — Medication 400 MILLIGRAM(S): at 12:22

## 2018-12-22 RX ADMIN — Medication 75 MILLIUNIT(S)/MIN: at 16:02

## 2018-12-22 RX ADMIN — SODIUM CHLORIDE 2000 MILLILITER(S): 9 INJECTION, SOLUTION INTRAVENOUS at 08:26

## 2018-12-22 NOTE — PROVIDER CONTACT NOTE (OTHER) - BACKGROUND
. 39.1 weeks. Repeat csection. Type II diabetic. Gestation htn. Takes metformin 1000BID, NPH 38 units at bedtime and labetolol 200TID.

## 2018-12-23 LAB
ALBUMIN SERPL ELPH-MCNC: 2.3 G/DL — LOW (ref 3.3–5)
ALP SERPL-CCNC: 80 U/L — SIGNIFICANT CHANGE UP (ref 40–120)
ALT FLD-CCNC: 17 U/L — SIGNIFICANT CHANGE UP (ref 12–78)
ANION GAP SERPL CALC-SCNC: 9 MMOL/L — SIGNIFICANT CHANGE UP (ref 5–17)
AST SERPL-CCNC: 24 U/L — SIGNIFICANT CHANGE UP (ref 15–37)
BILIRUB SERPL-MCNC: 0.4 MG/DL — SIGNIFICANT CHANGE UP (ref 0.2–1.2)
BUN SERPL-MCNC: 8 MG/DL — SIGNIFICANT CHANGE UP (ref 7–23)
CALCIUM SERPL-MCNC: 7.6 MG/DL — LOW (ref 8.5–10.1)
CHLORIDE SERPL-SCNC: 101 MMOL/L — SIGNIFICANT CHANGE UP (ref 96–108)
CO2 SERPL-SCNC: 24 MMOL/L — SIGNIFICANT CHANGE UP (ref 22–31)
CREAT SERPL-MCNC: 0.56 MG/DL — SIGNIFICANT CHANGE UP (ref 0.5–1.3)
GLUCOSE BLDC GLUCOMTR-MCNC: 105 MG/DL — HIGH (ref 70–99)
GLUCOSE BLDC GLUCOMTR-MCNC: 115 MG/DL — HIGH (ref 70–99)
GLUCOSE BLDC GLUCOMTR-MCNC: 126 MG/DL — HIGH (ref 70–99)
GLUCOSE BLDC GLUCOMTR-MCNC: 142 MG/DL — HIGH (ref 70–99)
GLUCOSE SERPL-MCNC: 112 MG/DL — HIGH (ref 70–99)
MAGNESIUM SERPL-MCNC: 3.2 MG/DL — HIGH (ref 1.6–2.6)
MAGNESIUM SERPL-MCNC: 3.9 MG/DL — HIGH (ref 1.6–2.6)
MAGNESIUM SERPL-MCNC: 4 MG/DL — HIGH (ref 1.6–2.6)
POTASSIUM SERPL-MCNC: 4.2 MMOL/L — SIGNIFICANT CHANGE UP (ref 3.5–5.3)
POTASSIUM SERPL-SCNC: 4.2 MMOL/L — SIGNIFICANT CHANGE UP (ref 3.5–5.3)
PROT SERPL-MCNC: 6.3 GM/DL — SIGNIFICANT CHANGE UP (ref 6–8.3)
RUBV IGG SER-ACNC: 0.7 INDEX — SIGNIFICANT CHANGE UP
RUBV IGG SER-IMP: NEGATIVE — SIGNIFICANT CHANGE UP
SODIUM SERPL-SCNC: 134 MMOL/L — LOW (ref 135–145)
T PALLIDUM AB TITR SER: NEGATIVE — SIGNIFICANT CHANGE UP

## 2018-12-23 RX ORDER — HEPARIN SODIUM 5000 [USP'U]/ML
5000 INJECTION INTRAVENOUS; SUBCUTANEOUS EVERY 8 HOURS
Qty: 0 | Refills: 0 | Status: DISCONTINUED | OUTPATIENT
Start: 2018-12-23 | End: 2018-12-25

## 2018-12-23 RX ADMIN — Medication 600 MILLIGRAM(S): at 21:42

## 2018-12-23 RX ADMIN — OXYCODONE AND ACETAMINOPHEN 1 TABLET(S): 5; 325 TABLET ORAL at 21:42

## 2018-12-23 RX ADMIN — METFORMIN HYDROCHLORIDE 1000 MILLIGRAM(S): 850 TABLET ORAL at 18:28

## 2018-12-23 RX ADMIN — Medication 50 GM/HR: at 06:36

## 2018-12-23 RX ADMIN — HEPARIN SODIUM 5000 UNIT(S): 5000 INJECTION INTRAVENOUS; SUBCUTANEOUS at 22:18

## 2018-12-23 RX ADMIN — Medication 200 MILLIGRAM(S): at 14:42

## 2018-12-23 RX ADMIN — SODIUM CHLORIDE 75 MILLILITER(S): 9 INJECTION, SOLUTION INTRAVENOUS at 06:36

## 2018-12-23 RX ADMIN — Medication 1 TABLET(S): at 12:15

## 2018-12-23 RX ADMIN — Medication 600 MILLIGRAM(S): at 20:45

## 2018-12-23 RX ADMIN — Medication 600 MILLIGRAM(S): at 12:13

## 2018-12-23 RX ADMIN — Medication 100 MILLIGRAM(S): at 12:13

## 2018-12-23 RX ADMIN — OXYCODONE AND ACETAMINOPHEN 1 TABLET(S): 5; 325 TABLET ORAL at 23:42

## 2018-12-23 RX ADMIN — Medication 200 MILLIGRAM(S): at 06:37

## 2018-12-23 RX ADMIN — Medication 200 MILLIGRAM(S): at 22:18

## 2018-12-23 RX ADMIN — OXYCODONE AND ACETAMINOPHEN 1 TABLET(S): 5; 325 TABLET ORAL at 20:46

## 2018-12-23 RX ADMIN — Medication 325 MILLIGRAM(S): at 12:13

## 2018-12-23 RX ADMIN — Medication 600 MILLIGRAM(S): at 02:12

## 2018-12-23 RX ADMIN — HEPARIN SODIUM 5000 UNIT(S): 5000 INJECTION INTRAVENOUS; SUBCUTANEOUS at 14:42

## 2018-12-23 RX ADMIN — OXYCODONE AND ACETAMINOPHEN 2 TABLET(S): 5; 325 TABLET ORAL at 13:29

## 2018-12-23 RX ADMIN — METFORMIN HYDROCHLORIDE 1000 MILLIGRAM(S): 850 TABLET ORAL at 02:35

## 2018-12-23 NOTE — PROGRESS NOTE ADULT - SUBJECTIVE AND OBJECTIVE BOX
She is a  33y  F who is now post-operative day 1 from .    Subjective:  The patient feels well overall, however admits mild intermittent nausea  Pt is tolerating a clear diet  Pt states she is ambulating, however not having flatus or bowel movements at this time.  She reports no breathing problems, headache or visual changes  She reports normal postpartum bleeding  Pain controlled with oral medications  She is breast feeding.    Vital Signs Last 24 Hrs  T(C): 37 (23 Dec 2018 07:30), Max: 37.1 (22 Dec 2018 21:00)  T(F): 98.6 (23 Dec 2018 07:30), Max: 98.8 (22 Dec 2018 21:00)  HR: 91 (23 Dec 2018 07:30) (70 - 103)  BP: 128/67 (23 Dec 2018 07:30) (109/60 - 182/115)  BP(mean): --  RR: 18 (23 Dec 2018 07:30) (10 - 28)  SpO2: 96% (23 Dec 2018 07:30) (90% - 100%)    Physical exam:  Obese female, in no acute distress.  Cardio: RRR, S1 and S2 present, no murmurs, rubs, or gallops  Pulm: CTA B/L, no increased work of breathing, no W/R/R  Abdomen: Soft, nontender, no distension , firm uterine fundus, nontender. Wound vac present over abdominal transverse incision, bandage with bloody leakage.   Ext: No DVT signs, warm extremities. 2+ pitting edema LLE, 1+ pitting edema RLE.    LABS:                        10.6   12.17 )-----------( 246      ( 22 Dec 2018 08:03 )             33.5     12-    134<L>  |  101  |  8   ----------------------------<  112<H>  4.2   |  24  |  0.56    Ca    7.6<L>      23 Dec 2018 06:12  Mg     3.9     12-    TPro  6.3  /  Alb  2.3<L>  /  TBili  0.4  /  DBili  x   /  AST  24  /  ALT  17  /  AlkPhos  80  12-    PT/INR - ( 22 Dec 2018 08:03 )   PT: 10.4 sec;   INR: 0.94 ratio         PTT - ( 22 Dec 2018 08:03 )  PTT:29.2 sec      Allergies    No Known Allergies    Intolerances

## 2018-12-23 NOTE — PROGRESS NOTE ADULT - ATTENDING COMMENTS
Pt seen and examined today 11:20. Sitting in chair, nursing baby. Capps to be removed at 13:00 (mg)  Fasting glucose 112. High glucose of 143 yesterday before metformin. Better today. Will start insulin if >150 mg/dl for tight control for wound healing.  Labetolol 200 mg TID started with good control. When magnesium stops this afternoon, close observation to see if sufficient for control.  Increase Heparin to 5000 IUs TID.  Baby has bruise over left cheek and outer lid from application of vacuum x1.  Left shoulder bruise being evaluated with xray now.    ZEUS Berg MD.  Communicated with resident

## 2018-12-24 LAB
BASOPHILS # BLD AUTO: 0.03 K/UL — SIGNIFICANT CHANGE UP (ref 0–0.2)
BASOPHILS NFR BLD AUTO: 0.3 % — SIGNIFICANT CHANGE UP (ref 0–2)
EOSINOPHIL # BLD AUTO: 0.15 K/UL — SIGNIFICANT CHANGE UP (ref 0–0.5)
EOSINOPHIL NFR BLD AUTO: 1.4 % — SIGNIFICANT CHANGE UP (ref 0–6)
GLUCOSE BLDC GLUCOMTR-MCNC: 107 MG/DL — HIGH (ref 70–99)
GLUCOSE BLDC GLUCOMTR-MCNC: 109 MG/DL — HIGH (ref 70–99)
GLUCOSE BLDC GLUCOMTR-MCNC: 109 MG/DL — HIGH (ref 70–99)
GLUCOSE BLDC GLUCOMTR-MCNC: 85 MG/DL — SIGNIFICANT CHANGE UP (ref 70–99)
HCT VFR BLD CALC: 25.7 % — LOW (ref 34.5–45)
HGB BLD-MCNC: 8.2 G/DL — LOW (ref 11.5–15.5)
IMM GRANULOCYTES NFR BLD AUTO: 0.6 % — SIGNIFICANT CHANGE UP (ref 0–1.5)
LYMPHOCYTES # BLD AUTO: 1.72 K/UL — SIGNIFICANT CHANGE UP (ref 1–3.3)
LYMPHOCYTES # BLD AUTO: 15.8 % — SIGNIFICANT CHANGE UP (ref 13–44)
MCHC RBC-ENTMCNC: 24.5 PG — LOW (ref 27–34)
MCHC RBC-ENTMCNC: 31.9 GM/DL — LOW (ref 32–36)
MCV RBC AUTO: 76.7 FL — LOW (ref 80–100)
MONOCYTES # BLD AUTO: 0.92 K/UL — HIGH (ref 0–0.9)
MONOCYTES NFR BLD AUTO: 8.4 % — SIGNIFICANT CHANGE UP (ref 2–14)
NEUTROPHILS # BLD AUTO: 8.02 K/UL — HIGH (ref 1.8–7.4)
NEUTROPHILS NFR BLD AUTO: 73.5 % — SIGNIFICANT CHANGE UP (ref 43–77)
NRBC # BLD: 0 /100 WBCS — SIGNIFICANT CHANGE UP (ref 0–0)
PLATELET # BLD AUTO: 204 K/UL — SIGNIFICANT CHANGE UP (ref 150–400)
RBC # BLD: 3.35 M/UL — LOW (ref 3.8–5.2)
RBC # FLD: 15.4 % — HIGH (ref 10.3–14.5)
WBC # BLD: 10.9 K/UL — HIGH (ref 3.8–10.5)
WBC # FLD AUTO: 10.9 K/UL — HIGH (ref 3.8–10.5)

## 2018-12-24 RX ADMIN — HEPARIN SODIUM 5000 UNIT(S): 5000 INJECTION INTRAVENOUS; SUBCUTANEOUS at 22:20

## 2018-12-24 RX ADMIN — OXYCODONE AND ACETAMINOPHEN 1 TABLET(S): 5; 325 TABLET ORAL at 12:47

## 2018-12-24 RX ADMIN — Medication 200 MILLIGRAM(S): at 22:21

## 2018-12-24 RX ADMIN — Medication 200 MILLIGRAM(S): at 14:33

## 2018-12-24 RX ADMIN — Medication 600 MILLIGRAM(S): at 13:20

## 2018-12-24 RX ADMIN — OXYCODONE AND ACETAMINOPHEN 1 TABLET(S): 5; 325 TABLET ORAL at 00:40

## 2018-12-24 RX ADMIN — Medication 600 MILLIGRAM(S): at 06:28

## 2018-12-24 RX ADMIN — OXYCODONE AND ACETAMINOPHEN 1 TABLET(S): 5; 325 TABLET ORAL at 06:28

## 2018-12-24 RX ADMIN — Medication 325 MILLIGRAM(S): at 12:45

## 2018-12-24 RX ADMIN — OXYCODONE AND ACETAMINOPHEN 1 TABLET(S): 5; 325 TABLET ORAL at 03:21

## 2018-12-24 RX ADMIN — METFORMIN HYDROCHLORIDE 1000 MILLIGRAM(S): 850 TABLET ORAL at 18:00

## 2018-12-24 RX ADMIN — OXYCODONE AND ACETAMINOPHEN 1 TABLET(S): 5; 325 TABLET ORAL at 04:20

## 2018-12-24 RX ADMIN — SIMETHICONE 80 MILLIGRAM(S): 80 TABLET, CHEWABLE ORAL at 12:48

## 2018-12-24 RX ADMIN — Medication 200 MILLIGRAM(S): at 06:28

## 2018-12-24 RX ADMIN — HEPARIN SODIUM 5000 UNIT(S): 5000 INJECTION INTRAVENOUS; SUBCUTANEOUS at 14:34

## 2018-12-24 RX ADMIN — OXYCODONE AND ACETAMINOPHEN 1 TABLET(S): 5; 325 TABLET ORAL at 20:37

## 2018-12-24 RX ADMIN — METFORMIN HYDROCHLORIDE 1000 MILLIGRAM(S): 850 TABLET ORAL at 07:24

## 2018-12-24 RX ADMIN — HEPARIN SODIUM 5000 UNIT(S): 5000 INJECTION INTRAVENOUS; SUBCUTANEOUS at 06:28

## 2018-12-24 RX ADMIN — Medication 600 MILLIGRAM(S): at 12:46

## 2018-12-24 RX ADMIN — OXYCODONE AND ACETAMINOPHEN 1 TABLET(S): 5; 325 TABLET ORAL at 13:20

## 2018-12-24 RX ADMIN — Medication 1 TABLET(S): at 12:46

## 2018-12-24 NOTE — PROGRESS NOTE ADULT - SUBJECTIVE AND OBJECTIVE BOX
Postpartum Note,  Section  33y woman post-operative day:  #2    Subjective:  She is ambulating and tolerating regular diet.  She is tolerating incisional pain well.    Physical exam:    Vital Signs Last 24 Hrs  T(C): 36.9 (24 Dec 2018 04:43), Max: 37.1 (23 Dec 2018 20:45)  T(F): 98.4 (24 Dec 2018 04:43), Max: 98.7 (23 Dec 2018 20:45)  HR: 98 (24 Dec 2018 04:43) (98 - 103)  BP: 148/79 (24 Dec 2018 04:43) (132/77 - 148/79)  RR: 18 (24 Dec 2018 04:43) (16 - 18)  SpO2: 98% (24 Dec 2018 04:43) (96% - 98%)  Lungs:  clear to auscultation bilaterally.  Abdomen: Soft, nontender, nondistended, bowel sounds normoactive  Incision: Swati Clean, dry, and intact.  Pump is functional.  Pelvic: light  lochia noted  Ext: No calf tenderness    LABS:                        8.2    10.90 )-----------( 204      ( 24 Dec 2018 06:10 )             25.7     Assessment and Plan  POD #2   s/p     Doing well.  Encourage ambulation.

## 2018-12-25 ENCOUNTER — TRANSCRIPTION ENCOUNTER (OUTPATIENT)
Age: 33
End: 2018-12-25

## 2018-12-25 VITALS
RESPIRATION RATE: 16 BRPM | SYSTOLIC BLOOD PRESSURE: 158 MMHG | OXYGEN SATURATION: 99 % | HEART RATE: 83 BPM | DIASTOLIC BLOOD PRESSURE: 91 MMHG | TEMPERATURE: 98 F

## 2018-12-25 LAB
GLUCOSE BLDC GLUCOMTR-MCNC: 124 MG/DL — HIGH (ref 70–99)
GLUCOSE BLDC GLUCOMTR-MCNC: 84 MG/DL — SIGNIFICANT CHANGE UP (ref 70–99)

## 2018-12-25 PROCEDURE — 99283 EMERGENCY DEPT VISIT LOW MDM: CPT

## 2018-12-25 RX ORDER — METFORMIN HYDROCHLORIDE 850 MG/1
0 TABLET ORAL
Qty: 0 | Refills: 0 | COMMUNITY

## 2018-12-25 RX ORDER — LABETALOL HCL 100 MG
1 TABLET ORAL
Qty: 0 | Refills: 0 | DISCHARGE
Start: 2018-12-25

## 2018-12-25 RX ORDER — ACETAMINOPHEN 500 MG
2 TABLET ORAL
Qty: 0 | Refills: 0 | DISCHARGE
Start: 2018-12-25

## 2018-12-25 RX ORDER — DOCUSATE SODIUM 100 MG
1 CAPSULE ORAL
Qty: 0 | Refills: 0 | DISCHARGE
Start: 2018-12-25

## 2018-12-25 RX ORDER — LANOLIN
1 OINTMENT (GRAM) TOPICAL
Qty: 0 | Refills: 0 | DISCHARGE
Start: 2018-12-25

## 2018-12-25 RX ORDER — IBUPROFEN 200 MG
1 TABLET ORAL
Qty: 0 | Refills: 0 | DISCHARGE
Start: 2018-12-25

## 2018-12-25 RX ADMIN — METFORMIN HYDROCHLORIDE 1000 MILLIGRAM(S): 850 TABLET ORAL at 07:58

## 2018-12-25 RX ADMIN — HEPARIN SODIUM 5000 UNIT(S): 5000 INJECTION INTRAVENOUS; SUBCUTANEOUS at 06:17

## 2018-12-25 RX ADMIN — Medication 200 MILLIGRAM(S): at 06:17

## 2018-12-25 RX ADMIN — Medication 600 MILLIGRAM(S): at 07:59

## 2018-12-25 RX ADMIN — Medication 0.5 MILLILITER(S): at 10:53

## 2018-12-25 RX ADMIN — SIMETHICONE 80 MILLIGRAM(S): 80 TABLET, CHEWABLE ORAL at 03:43

## 2018-12-25 RX ADMIN — OXYCODONE AND ACETAMINOPHEN 2 TABLET(S): 5; 325 TABLET ORAL at 03:40

## 2018-12-25 RX ADMIN — Medication 600 MILLIGRAM(S): at 08:45

## 2018-12-25 RX ADMIN — OXYCODONE AND ACETAMINOPHEN 2 TABLET(S): 5; 325 TABLET ORAL at 10:55

## 2018-12-25 RX ADMIN — OXYCODONE AND ACETAMINOPHEN 2 TABLET(S): 5; 325 TABLET ORAL at 04:40

## 2018-12-25 NOTE — DISCHARGE NOTE OB - PLAN OF CARE
healthy mother and son keep incision clean and dry. Remove WOOD dressing in 6 days. RTO in 6 days. Keep incision dry. Resume and take all medications on time /90 or less Labetolol 200 mg three times daily Take random fasting and 1 hour after meal. Fasting <90 and after meal <140. If higher, call me, Dr Olivo. Metformin 1000mg BID good sugar control especially as wound heals appropriate weight loss in the post partum period

## 2018-12-25 NOTE — DISCHARGE NOTE OB - CARE PROVIDER_API CALL
Iesha Berg), Obstetrics and Gynecology  [address] [place] [address]  Phone: [phone]  Fax: [phone]

## 2018-12-25 NOTE — PROGRESS NOTE ADULT - SUBJECTIVE AND OBJECTIVE BOX
Postpartum Note,  Section  She is a  33y woman who is now post-operative day: 3    Subjective:  The patient feels well.  She is ambulating.   She is tolerating regular diet.  She denies nausea and vomiting.  She is voiding.  Her pain is controlled.  She reports normal postpartum bleeding.  She is breastfeeding.  She is formula feeding.    Allergies    No Known Allergies    Intolerances        Physical exam:    Vital Signs Last 24 Hrs  T(C): 36.5 (25 Dec 2018 08:52), Max: 37.2 (24 Dec 2018 16:05)  T(F): 97.7 (25 Dec 2018 08:52), Max: 98.9 (24 Dec 2018 16:05)  HR: 83 (25 Dec 2018 08:52) (83 - 98)  BP: 158/91 (25 Dec 2018 08:52) (142/85 - 166/88)  BP(mean): --  RR: 16 (25 Dec 2018 08:52) (16 - 18)  SpO2: 99% (25 Dec 2018 08:52) (97% - 99%)    Gen: NAD  Breast: Soft, nontender, not engorged.  Abdomen: Soft, nontender, no distension , firm uterine fundus at umbilicus.  Incision: Clean, dry, and intact with franki. WOOD dressing changed as it had old blood  Pelvic: Normal lochia noted  Ext: No calf tenderness    LABS:                        8.2    10.90 )-----------( 204      ( 24 Dec 2018 06:10 )             25.7       Rubella status: IMMUNE  measles:  Non IMMUNE      MEDICATIONS  (STANDING):  diphtheria/tetanus/pertussis (acellular) Vaccine (ADAcel) 0.5 milliLiter(s) IntraMuscular once  ferrous    sulfate 325 milliGRAM(s) Oral daily  heparin  Injectable 5000 Unit(s) SubCutaneous every 8 hours  labetalol 200 milliGRAM(s) Oral three times a day  lactated ringers. 1000 milliLiter(s) (125 mL/Hr) IV Continuous <Continuous>  lactated ringers. 1000 milliLiter(s) (75 mL/Hr) IV Continuous <Continuous>  magnesium sulfate Infusion 2 Gm/Hr (50 mL/Hr) IV Continuous <Continuous>  measles/mumps/rubella Vaccine 0.5 milliLiter(s) SubCutaneous once  metFORMIN 1000 milliGRAM(s) Oral two times a day  morphine PF Spinal 0.15 milliGRAM(s) IntraThecal. once  oxytocin Infusion 333.333 milliUNIT(s)/Min (1000 mL/Hr) IV Continuous <Continuous>  oxytocin Infusion 25 milliUNIT(s)/Min (75 mL/Hr) IV Continuous <Continuous>  prenatal multivitamin 1 Tablet(s) Oral daily    MEDICATIONS  (PRN):  acetaminophen   Tablet .. 650 milliGRAM(s) Oral every 6 hours PRN Temp greater or equal to 38.5C (101.3F), Mild Pain (1 - 3)  diphenhydrAMINE 25 milliGRAM(s) Oral every 6 hours PRN Itching  docusate sodium 100 milliGRAM(s) Oral two times a day PRN Stool Softening  glycerin Suppository - Adult 1 Suppository(s) Rectal at bedtime PRN Constipation  HYDROmorphone  Injectable 2 milliGRAM(s) SubCutaneous every 3 hours PRN Severe Pain (7 - 10)  HYDROmorphone  Injectable 0.5 milliGRAM(s) IV Push every 10 minutes PRN Moderate Pain (4 - 6)  ibuprofen  Tablet. 600 milliGRAM(s) Oral every 6 hours PRN Mild Pain (1 - 3)  lanolin Ointment 1 Application(s) Topical every 3 hours PRN Sore Nipples  naloxone Injectable 0.1 milliGRAM(s) IV Push every 3 minutes PRN For ANY of the following changes in patient status:  A. RR LESS THAN 10 breaths per minute, B. Oxygen saturation LESS THAN 90%, C. Sedation score of 6  ondansetron Injectable 4 milliGRAM(s) IV Push every 6 hours PRN Nausea  ondansetron Injectable 2 milliGRAM(s) IV Push every 6 hours PRN Nausea and/or Vomiting  oxyCODONE    5 mG/acetaminophen 325 mG 1 Tablet(s) Oral every 3 hours PRN Moderate Pain (4 - 6)  oxyCODONE    5 mG/acetaminophen 325 mG 2 Tablet(s) Oral every 6 hours PRN Severe Pain (7 - 10)  simethicone 80 milliGRAM(s) Chew every 4 hours PRN Gas        Assessment and Plan  POD #3  s/p repeat C/S  Doing well.  Help to establish breastfeeding.  CBC stable.  MMR prior to discharge.    Baby bruise on left side of face looks better  lighter and dissipating  bruise over brow but no scleral edema noted by ped NP on exam  Patient and  given numbers for pediatric opthalmologists and   asked to make an appointment in next week for evaluation  Dr Bae   Dr Ayon   Dr Morocho [phone]    Bruise on left shoulder resolving  X-ray of shoulder negative for fracture.    Circumcision declined after R/B/A discussion    Discharge home with verbal and written instructions  ZEUS Berg MD

## 2018-12-25 NOTE — DISCHARGE NOTE OB - SECONDARY DIAGNOSIS.
Hypertension, unspecified type Diabetes Type 2 diabetes mellitus with other skin complication Morbid obesity with BMI of 50.0-59.9, adult

## 2018-12-25 NOTE — DISCHARGE NOTE OB - CARE PROVIDERS DIRECT ADDRESSES
[email or web address]

## 2018-12-25 NOTE — DISCHARGE NOTE OB - MEDICATION SUMMARY - MEDICATIONS TO TAKE
I will START or STAY ON the medications listed below when I get home from the hospital:    acetaminophen 325 mg oral tablet  -- 2 tab(s) by mouth every 6 hours, As needed, Temp greater or equal to 38.5C (101.3F), Mild Pain (1 - 3)  -- Indication: For as needed instead of percocet(oxycodone-acetaminophen)    oxycodone-acetaminophen 5 mg-325 mg oral tablet  -- 1 tab(s) by mouth every 3 hours, As needed, Moderate Pain (4 - 6)  -- Indication: For use as needed every 3-6 hours    ibuprofen 600 mg oral tablet  -- 1 tab(s) by mouth every 6 hours, As needed, Mild Pain (1 - 3)  -- Indication: For as needed for incisional pain and cramping. Stop by 7 days post partum.    labetalol 200 mg oral tablet  -- 1 tab(s) by mouth 3 times a day  -- Indication: For continue    lanolin topical ointment  -- 1 application on skin every 3 hours, As needed, Sore Nipples  -- Indication: For for nipple cracking    Prenatal Multivitamins with Folic Acid 1 mg oral tablet  -- 1 tab(s) by mouth once a day  -- Indication: For continue for as long as nursing    docusate sodium 100 mg oral capsule  -- 1 cap(s) by mouth 2 times a day, As needed, Stool Softening  -- Indication: For for prevention of hard stools

## 2018-12-25 NOTE — DISCHARGE NOTE OB - CARE PLAN
Principal Discharge DX:	H/O  section  Goal:	healthy mother and son  Assessment and plan of treatment:	keep incision clean and dry. Remove WOOD dressing in 6 days. RTO in 6 days. Keep incision dry. Resume and take all medications on time  Secondary Diagnosis:	Hypertension, unspecified type  Goal:	/90 or less  Assessment and plan of treatment:	Labetolol 200 mg three times daily  Secondary Diagnosis:	Diabetes  Goal:	Take random fasting and 1 hour after meal. Fasting <90 and after meal <140. If higher, call meDr Olivo.  Assessment and plan of treatment:	Metformin 1000mg BID  Secondary Diagnosis:	Type 2 diabetes mellitus with other skin complication  Goal:	good sugar control especially as wound heals  Secondary Diagnosis:	Morbid obesity with BMI of 50.0-59.9, adult  Goal:	appropriate weight loss in the post partum period

## 2018-12-25 NOTE — DISCHARGE NOTE OB - HOSPITAL COURSE
Admitted for repeat c/s. Live male born requiring vacuum application to deliver head.  left facial bruise managed and treated expectantly.  mother and son establishing nursing. Wound inspected prior to discharge.  Return to office in 6 days.  Resume all blood pressure and oral hypoglycemic medications

## 2018-12-25 NOTE — DISCHARGE NOTE OB - PATIENT PORTAL LINK FT
You can access the [facility] Patient Portal, offered by [facility], by registering with the following website: [email or web address]

## 2018-12-28 DIAGNOSIS — Z3A.39 39 WEEKS GESTATION OF PREGNANCY: ICD-10-CM

## 2018-12-28 DIAGNOSIS — E66.01 MORBID (SEVERE) OBESITY DUE TO EXCESS CALORIES: ICD-10-CM

## 2018-12-28 DIAGNOSIS — O34.211 MATERNAL CARE FOR LOW TRANSVERSE SCAR FROM PREVIOUS CESAREAN DELIVERY: ICD-10-CM

## 2018-12-28 LAB — SURGICAL PATHOLOGY FINAL REPORT - CH: SIGNIFICANT CHANGE UP

## 2018-12-31 ENCOUNTER — APPOINTMENT (OUTPATIENT)
Dept: OBGYN | Facility: CLINIC | Age: 33
End: 2018-12-31
Payer: COMMERCIAL

## 2018-12-31 VITALS
WEIGHT: 293 LBS | BODY MASS INDEX: 48.82 KG/M2 | DIASTOLIC BLOOD PRESSURE: 100 MMHG | SYSTOLIC BLOOD PRESSURE: 160 MMHG | HEIGHT: 65 IN

## 2018-12-31 PROCEDURE — 0503F POSTPARTUM CARE VISIT: CPT

## 2018-12-31 RX ORDER — ASPIRIN ENTERIC COATED TABLETS 81 MG 81 MG/1
81 TABLET, DELAYED RELEASE ORAL
Qty: 30 | Refills: 0 | Status: COMPLETED | COMMUNITY
Start: 2018-05-08 | End: 2018-12-21

## 2018-12-31 RX ORDER — ASPIRIN 81 MG
81 TABLET,CHEWABLE ORAL
Refills: 0 | Status: COMPLETED | COMMUNITY
End: 2018-12-21

## 2018-12-31 RX ORDER — INSULIN HUMAN 100 [IU]/ML
100 INJECTION, SUSPENSION SUBCUTANEOUS
Qty: 5 | Refills: 1 | Status: COMPLETED | COMMUNITY
Start: 2018-05-14 | End: 2018-12-21

## 2018-12-31 RX ORDER — PEN NEEDLE, DIABETIC 29 G X1/2"
32G X 4 MM NEEDLE, DISPOSABLE MISCELLANEOUS
Qty: 100 | Refills: 0 | Status: COMPLETED | COMMUNITY
Start: 2018-05-14 | End: 2018-12-22

## 2019-01-01 ENCOUNTER — EMERGENCY (EMERGENCY)
Facility: HOSPITAL | Age: 34
LOS: 0 days | Discharge: HOME | End: 2019-01-01
Attending: FAMILY MEDICINE | Admitting: FAMILY MEDICINE
Payer: COMMERCIAL

## 2019-01-01 VITALS
HEART RATE: 98 BPM | SYSTOLIC BLOOD PRESSURE: 158 MMHG | DIASTOLIC BLOOD PRESSURE: 93 MMHG | OXYGEN SATURATION: 96 % | RESPIRATION RATE: 17 BRPM | TEMPERATURE: 98 F

## 2019-01-01 DIAGNOSIS — L76.34 POSTPROCEDURAL SEROMA OF SKIN AND SUBCUTANEOUS TISSUE FOLLOWING OTHER PROCEDURE: ICD-10-CM

## 2019-01-01 DIAGNOSIS — E11.9 TYPE 2 DIABETES MELLITUS WITHOUT COMPLICATIONS: ICD-10-CM

## 2019-01-01 DIAGNOSIS — N61.0 MASTITIS WITHOUT ABSCESS: ICD-10-CM
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
## 2019-01-01 DIAGNOSIS — I10 ESSENTIAL (PRIMARY) HYPERTENSION: ICD-10-CM

## 2019-01-01 DIAGNOSIS — Z79.1 LONG TERM (CURRENT) USE OF NON-STEROIDAL ANTI-INFLAMMATORIES (NSAID): ICD-10-CM

## 2019-01-01 PROCEDURE — 99284 EMERGENCY DEPT VISIT MOD MDM: CPT

## 2019-01-01 RX ORDER — CEPHALEXIN 500 MG
1 CAPSULE ORAL
Qty: 40 | Refills: 0
Start: 2019-01-01 | End: 2019-01-10

## 2019-01-01 NOTE — ED ADULT NURSE NOTE - OBJECTIVE STATEMENT
Pt presents to ED from home, pt alert and orientedx4, VSS afebrile, pt states she had c section of 10/22/18 had her staples and banages removed yestreday and has had bleeding since this AM. pt states the discharge is bloody and thick. [pt states she also has bleeding from left breast with a hard lump. pt states she is currently nursing. no fevers. safety maintained will continue to monitor

## 2019-01-01 NOTE — ED PROVIDER NOTE - PROGRESS NOTE DETAILS
Diomedes Rubio: Case discussed with OB Dr. Carter who states he will be down to see pt but is with another pt who is currently 9cm.

## 2019-01-01 NOTE — CONSULT NOTE ADULT - SUBJECTIVE AND OBJECTIVE BOX
34 y/o S/P repeat C/S no 12/22/18 here wtih c/o of leakage from incision.  Pt had WOOD dressing on and it was removed yesterday by Dr. Berg.  Since then pt has had a lot of serosanguinous drainage.  Pt also with c/o of left breast mass under breast.  Pt states that purulent, malodorous fluid came out today.  Pt denies any temperatures.    PMH - HTN, DM      PE - VSS, afebrile    PE - Incision - well healed except for two separate 3-4 mm areas on incision site.  Both sites with serosanguinous drainage.  No signs of purulence or erythema.  No tenderness.  Area probed with q-tip and fascia felt to be intact.      Left breast - under left breast at 5 o'clock pt with breast abcess/cellulitis.  No drainage noted.  +erythme around area and drainage site noted.  Area probed with q-tip and no purulence material expressed.  Breast felt indurated/hard.

## 2019-01-01 NOTE — ED PROVIDER NOTE - OBJECTIVE STATEMENT
32 y/o female with a PMHx of DM, HTN presents to the ED sent in by Dr. Berg s/p . Pt reports she had a  on 18 and f/u with Dr Berg for Swati dressing and staple removal. Pt reports the gauze at home became soaked with blood and continued to produce light red discharge from the wound. Pt called her OBGYN who noted that she come to the ED. Pt currently feels fatigued. Pt is also noted a nodule on her left breast under the areola with bleeding.  Denies URI symptoms. Last BGM yesterday of 95. PCP Dr. Stafford ob

## 2019-01-01 NOTE — ED ADULT TRIAGE NOTE - CHIEF COMPLAINT QUOTE
Patient presents stating she had  6 days ago, bandages were removed and patient is bleeding from surgical site. Reports mild shortness of breath and lightheaded.

## 2019-01-01 NOTE — CONSULT NOTE ADULT - ASSESSMENT
A/P Pt with seroma at  incision site.  Pt with breast cellulitis on left.  Pt in stable condition and is afebrile.    Will    1) Incision - Incision seroma expressed and AgNo3 also used to cauterize incision site.--no further drainage noted however dressing reapplied.  2) Breast cellulitis - pt to take Keflex 500 mg po QID.  Pt told that if she feels worse, or gets a fever or if the breast gets worse then she will have to come back for IV abx--pt verbalized understanding (prescription already sent to pharmacy)  3) Pt fo F/U with Dr. Berg on 19.

## 2019-01-01 NOTE — ED PROVIDER NOTE - MEDICAL DECISION MAKING DETAILS
Pt with bloody drainage s/p  with probable seroma, also has boil or mass of left breast, probable abx, may need drainage.

## 2019-01-01 NOTE — ED PROVIDER NOTE - CARE PLAN
Principal Discharge DX:	Seroma of skin or subcutaneous tissue after non-dermatologic procedure  Secondary Diagnosis:	Breast infection in female

## 2019-01-01 NOTE — ED ADULT NURSE NOTE - NSIMPLEMENTINTERV_GEN_ALL_ED
Implemented All Universal Safety Interventions:  [place] to call system. Call bell, personal items and telephone within reach. Instruct patient to call for assistance. Room bathroom lighting operational. Non-slip footwear when patient is off stretcher. Physically safe environment: no spills, clutter or unnecessary equipment. Stretcher in lowest position, wheels locked, appropriate side rails in place.

## 2019-01-01 NOTE — ED PROVIDER NOTE - SKIN, MLM
+2.5cm nodule with bloody drainage to left lateral breast +clean transverse  wound with 2 pinpoint areas of active of sanguineous drainage

## 2019-01-02 ENCOUNTER — APPOINTMENT (OUTPATIENT)
Dept: OBGYN | Facility: CLINIC | Age: 34
End: 2019-01-02
Payer: COMMERCIAL

## 2019-01-02 VITALS
TEMPERATURE: 98.5 F | BODY MASS INDEX: 48.82 KG/M2 | SYSTOLIC BLOOD PRESSURE: 140 MMHG | DIASTOLIC BLOOD PRESSURE: 100 MMHG | HEIGHT: 65 IN | WEIGHT: 293 LBS

## 2019-01-02 PROCEDURE — 0503F POSTPARTUM CARE VISIT: CPT

## 2019-01-04 ENCOUNTER — APPOINTMENT (OUTPATIENT)
Dept: OBGYN | Facility: CLINIC | Age: 34
End: 2019-01-04
Payer: COMMERCIAL

## 2019-01-04 VITALS
TEMPERATURE: 98.2 F | HEIGHT: 65 IN | BODY MASS INDEX: 48.82 KG/M2 | WEIGHT: 293 LBS | DIASTOLIC BLOOD PRESSURE: 94 MMHG | SYSTOLIC BLOOD PRESSURE: 160 MMHG

## 2019-01-04 PROCEDURE — 0503F POSTPARTUM CARE VISIT: CPT

## 2019-01-06 ENCOUNTER — NON-APPOINTMENT (OUTPATIENT)
Age: 34
End: 2019-01-06

## 2019-01-06 PROBLEM — I10 ESSENTIAL (PRIMARY) HYPERTENSION: Chronic | Status: ACTIVE | Noted: 2019-01-01

## 2019-01-06 NOTE — CHIEF COMPLAINT
[Post Op Day: ___] : post op day #[unfilled] [Procedure: ___] : status post [unfilled] [Indication: ___] : for [unfilled] [de-identified] : Wound seroma for removal of 1/2" packing

## 2019-01-06 NOTE — HISTORY OF PRESENT ILLNESS
[3/10] : the patient reports pain that is 3/10 in severity [Fever] : no fever [Chills] : no chills [Nausea] : no nausea [Vomiting] : no vomiting [Diarrhea] : no diarrhea [Vaginal Bleeding] : vaginal bleeding [Pelvic Pressure] : no pelvic pressure [Vaginal Discharge] : no vaginal discharge [Constipation] : no constipation [Erythema] : not erythematous [Swelling] : not swollen [Dehiscence] : not dehisced [Discharge] : noted to have a ~M discharge [Mild] : mild vaginal bleeding [de-identified] : mild redness along incision line. Port wine colored fluid on dressing.  [de-identified] : pt has known AsGDM, HTN, morbid obesity and GBS, despite Ancef 3gm pre-op, she is aware that she was at increased risk of wound infection. with ongoing metformin 1gm BID, pt currently has good glycemic control per her home fingersticks. Labetalol 200mg TID stable. [de-identified] : Using saline and hydrogen peroxide, wound irrigated until serous colored. 1/2" gauze 20 cm strip placed in both small openings with clear instructions. RTO 2d.

## 2019-01-06 NOTE — HISTORY OF PRESENT ILLNESS
[1/10] : the patient reports pain that is 1/10 in severity [Fever] : no fever [Chills] : no chills [Nausea] : no nausea [Vomiting] : no vomiting [Diarrhea] : no diarrhea [Constipation] : no constipation [Swelling] : not swollen [Dehiscence] : not dehisced [Healed] : healed [Discharge] : noted to have a ~M discharge [Doing Well] : is doing well [Excellent Pain Control] : has excellent pain control [No Sign of Infection] : is showing no signs of infection [de-identified] : clear to light yellow serous fluid noted on combine dressing under pannus.  Pt reporting that 1/2" plain packing fell out on 1/3/18(yesterday).  Skin clean, moist but without redness, discharge nor inflammation. midline 4 mm opening and right of midline 2 mm opening.  [de-identified] : Normal saline and H2O2 irrigation of incision performed. clear fluid released. Repacked openings now with 14 cm and 12 cm lengths of packing. Left under side of breast healing [de-identified] : RTO in 5 days. Continue daily shower, careful drying of incision with blow dryer and place opened 4 x4 " gauze across incision. Complete Keflex.

## 2019-01-07 ENCOUNTER — APPOINTMENT (OUTPATIENT)
Dept: OBGYN | Facility: CLINIC | Age: 34
End: 2019-01-07
Payer: COMMERCIAL

## 2019-01-07 PROCEDURE — 0503F POSTPARTUM CARE VISIT: CPT

## 2019-01-07 NOTE — HISTORY OF PRESENT ILLNESS
[0/10] : no pain reported [Fever] : no fever [Chills] : no chills [Vomiting] : no vomiting [Diarrhea] : no diarrhea [Clean/Dry/Intact] : clean, dry and intact [Swelling] : not swollen [Dehiscence] : not dehisced [Discharge] : absent of discharge [Doing Well] : is doing well [No Sign of Infection] : is showing no signs of infection [de-identified] : Two incisional drainage points have closed.  [de-identified] : Left breast abscess and 5 x 5 x 3 mm opening now closed. Healing well.  RTO 3 weeks. [de-identified] : Complete Keflex in next day.

## 2019-02-01 ENCOUNTER — RX RENEWAL (OUTPATIENT)
Age: 34
End: 2019-02-01

## 2019-02-04 ENCOUNTER — APPOINTMENT (OUTPATIENT)
Dept: OBGYN | Facility: CLINIC | Age: 34
End: 2019-02-04
Payer: COMMERCIAL

## 2019-02-11 ENCOUNTER — APPOINTMENT (OUTPATIENT)
Dept: OBGYN | Facility: CLINIC | Age: 34
End: 2019-02-11
Payer: COMMERCIAL

## 2019-02-11 VITALS
SYSTOLIC BLOOD PRESSURE: 150 MMHG | BODY MASS INDEX: 48.82 KG/M2 | WEIGHT: 293 LBS | HEIGHT: 65 IN | DIASTOLIC BLOOD PRESSURE: 100 MMHG

## 2019-02-11 PROCEDURE — 0503F POSTPARTUM CARE VISIT: CPT

## 2019-02-11 RX ORDER — BLOOD SUGAR DIAGNOSTIC
STRIP MISCELLANEOUS
Qty: 2 | Refills: 1 | Status: COMPLETED | COMMUNITY
Start: 2018-03-23 | End: 2019-02-11

## 2019-02-11 RX ORDER — PEN NEEDLE, DIABETIC 29 G X1/2"
31G X 8 MM NEEDLE, DISPOSABLE MISCELLANEOUS
Qty: 1 | Refills: 2 | Status: COMPLETED | COMMUNITY
Start: 2019-02-01 | End: 2019-02-11

## 2019-02-11 RX ORDER — LANCETS 33 GAUGE
EACH MISCELLANEOUS
Qty: 2 | Refills: 1 | Status: COMPLETED | COMMUNITY
Start: 2018-03-23 | End: 2019-02-11

## 2019-02-11 NOTE — HISTORY OF PRESENT ILLNESS
[Postpartum Follow Up] : postpartum follow up [Delivery Date: ___] : on [unfilled] [Repeat C/S] : delivered by  section (repeat) [de-identified] : HbA1c now. Cont metformin 1000 BID. Return to Endocrinologist,  in [place].  Micronor for birth control now. Return to diet and exercise so HbA1c is <6.0 prior to conception.  Cont PNV, labetolol 200 mg TID.  Suggest nutritionist at Dr Valles, bariatric surgeon. Pt not interested in gastric sleeve at present.  HPV in past. Repeat pap in 6 weeks.

## 2019-02-12 ENCOUNTER — RX RENEWAL (OUTPATIENT)
Age: 34
End: 2019-02-12

## 2019-02-26 ENCOUNTER — RX RENEWAL (OUTPATIENT)
Age: 34
End: 2019-02-26

## 2019-03-08 ENCOUNTER — RX RENEWAL (OUTPATIENT)
Age: 34
End: 2019-03-08

## 2019-03-13 ENCOUNTER — RX RENEWAL (OUTPATIENT)
Age: 34
End: 2019-03-13

## 2019-03-25 ENCOUNTER — APPOINTMENT (OUTPATIENT)
Dept: OBGYN | Facility: CLINIC | Age: 34
End: 2019-03-25

## 2019-04-17 ENCOUNTER — APPOINTMENT (OUTPATIENT)
Dept: OBGYN | Facility: CLINIC | Age: 34
End: 2019-04-17
Payer: COMMERCIAL

## 2019-04-17 VITALS
SYSTOLIC BLOOD PRESSURE: 144 MMHG | DIASTOLIC BLOOD PRESSURE: 80 MMHG | BODY MASS INDEX: 48.82 KG/M2 | HEIGHT: 65 IN | WEIGHT: 293 LBS

## 2019-04-17 DIAGNOSIS — B37.9 CANDIDIASIS, UNSPECIFIED: ICD-10-CM

## 2019-04-17 DIAGNOSIS — Z78.9 OTHER SPECIFIED HEALTH STATUS: ICD-10-CM

## 2019-04-17 DIAGNOSIS — Z34.83 ENCOUNTER FOR SUPERVISION OF OTHER NORMAL PREGNANCY, THIRD TRIMESTER: ICD-10-CM

## 2019-04-17 DIAGNOSIS — O28.0 ABNORMAL HEMATOLOGICAL FINDING ON ANTENATAL SCREENING OF MOTHER: ICD-10-CM

## 2019-04-17 DIAGNOSIS — O09.899 ABNORMAL HEMATOLOGICAL FINDING ON ANTENATAL SCREENING OF MOTHER: ICD-10-CM

## 2019-04-17 PROCEDURE — 99395 PREV VISIT EST AGE 18-39: CPT

## 2019-04-18 PROBLEM — B37.9 MONILIA INFECTION: Status: ACTIVE | Noted: 2019-02-11

## 2019-04-18 PROBLEM — Z34.83 MULTIGRAVIDA IN THIRD TRIMESTER: Status: RESOLVED | Noted: 2018-10-15 | Resolved: 2019-04-18

## 2019-04-18 PROBLEM — O28.0 HIGH RISK PREGNANCY WITH LOW PAPPA: Status: RESOLVED | Noted: 2018-10-02 | Resolved: 2019-04-18

## 2019-04-18 RX ORDER — BLOOD SUGAR DIAGNOSTIC
STRIP MISCELLANEOUS
Qty: 60 | Refills: 2 | Status: COMPLETED | COMMUNITY
Start: 2019-02-12 | End: 2019-04-18

## 2019-04-18 RX ORDER — CHLORHEXIDINE GLUCONATE 4 %
325 (65 FE) LIQUID (ML) TOPICAL
Qty: 60 | Refills: 0 | Status: COMPLETED | COMMUNITY
Start: 2018-05-07 | End: 2019-04-18

## 2019-04-18 RX ORDER — PRENATAL VIT NO.126/IRON/FOLIC 28MG-0.8MG
28-0.8 TABLET ORAL
Refills: 0 | Status: COMPLETED | COMMUNITY
End: 2019-04-18

## 2019-04-18 NOTE — COUNSELING
[Nutrition] : nutrition [Breast Self Exam] : breast self exam [Exercise] : exercise [Vitamins/Supplements] : vitamins/supplements [Contraception] : contraception

## 2019-04-18 NOTE — PHYSICAL EXAM
[Awake] : awake [Alert] : alert [Acute Distress] : no acute distress [LAD] : no lymphadenopathy [Thyroid Nodule] : no thyroid nodule [Goiter] : no goiter [Mass] : no breast mass [Nipple Discharge] : no nipple discharge [Axillary LAD] : no axillary lymphadenopathy [Soft] : not soft [Distended] : not distended [Tender] : non tender [H/Smegaly] : no hepatosplenomegaly [Oriented x3] : oriented to person, place, and time [Depressed Mood] : not depressed [Flat Affect] : affect not flat [Normal] : uterus [Uterine Adnexae] : were not tender and not enlarged [No Bleeding] : there was no active vaginal bleeding

## 2019-04-18 NOTE — HISTORY OF PRESENT ILLNESS
[Last Pap ___] : Last cervical pap smear was [unfilled] [Monogamous] : is monogamous [Sexually Active] : is sexually active [Male ___] : [unfilled] male [de-identified] : Engaged to FOB

## 2019-04-18 NOTE — CHIEF COMPLAINT
[Annual Visit] : annual visit [FreeTextEntry1] : 34 yo  with LMP 19 presents for first annual after second c / s for son.

## 2019-04-19 ENCOUNTER — RX CHANGE (OUTPATIENT)
Age: 34
End: 2019-04-19

## 2019-04-19 LAB — HPV HIGH+LOW RISK DNA PNL CVX: NOT DETECTED

## 2019-04-19 RX ORDER — METRONIDAZOLE 7.5 MG/G
0.75 GEL VAGINAL
Qty: 1 | Refills: 0 | Status: COMPLETED | COMMUNITY
Start: 2019-04-19 | End: 2019-04-24

## 2019-05-21 ENCOUNTER — RX RENEWAL (OUTPATIENT)
Age: 34
End: 2019-05-21

## 2019-06-03 PROBLEM — Z78.9 BIRTH CONTROL: Status: ACTIVE | Noted: 2019-02-11

## 2019-07-10 NOTE — ED STATDOCS - DATE/TIME 1
Take medications as directed and follow-up with Dr. Magdaleno tomorrow at 1 pm.  You can use over-the-counter ibuprofen as needed for fever.  Drink plenty of fluids.  If you have trouble drinking, eat popsicles.  This will soothe your throat and give you liquid.   19-Feb-2018 12:00

## 2019-07-24 ENCOUNTER — RX RENEWAL (OUTPATIENT)
Age: 34
End: 2019-07-24

## 2019-09-09 ENCOUNTER — RX RENEWAL (OUTPATIENT)
Age: 34
End: 2019-09-09

## 2019-10-06 ENCOUNTER — TRANSCRIPTION ENCOUNTER (OUTPATIENT)
Age: 34
End: 2019-10-06

## 2019-10-18 ENCOUNTER — APPOINTMENT (OUTPATIENT)
Dept: OBGYN | Facility: CLINIC | Age: 34
End: 2019-10-18

## 2019-11-06 ENCOUNTER — APPOINTMENT (OUTPATIENT)
Dept: FAMILY MEDICINE | Facility: CLINIC | Age: 34
End: 2019-11-06
Payer: COMMERCIAL

## 2019-11-06 VITALS
RESPIRATION RATE: 16 BRPM | DIASTOLIC BLOOD PRESSURE: 80 MMHG | SYSTOLIC BLOOD PRESSURE: 140 MMHG | HEIGHT: 65 IN | HEART RATE: 98 BPM | OXYGEN SATURATION: 97 % | WEIGHT: 293 LBS | BODY MASS INDEX: 48.82 KG/M2

## 2019-11-06 DIAGNOSIS — Z86.39 PERSONAL HISTORY OF OTHER ENDOCRINE, NUTRITIONAL AND METABOLIC DISEASE: ICD-10-CM

## 2019-11-06 DIAGNOSIS — Z82.49 FAMILY HISTORY OF ISCHEMIC HEART DISEASE AND OTHER DISEASES OF THE CIRCULATORY SYSTEM: ICD-10-CM

## 2019-11-06 DIAGNOSIS — Z82.3 FAMILY HISTORY OF STROKE: ICD-10-CM

## 2019-11-06 DIAGNOSIS — Z83.3 FAMILY HISTORY OF DIABETES MELLITUS: ICD-10-CM

## 2019-11-06 DIAGNOSIS — R73.03 PREDIABETES.: ICD-10-CM

## 2019-11-06 DIAGNOSIS — Z80.8 FAMILY HISTORY OF MALIGNANT NEOPLASM OF OTHER ORGANS OR SYSTEMS: ICD-10-CM

## 2019-11-06 LAB — CYTOLOGY CVX/VAG DOC THIN PREP: NORMAL

## 2019-11-06 PROCEDURE — 99203 OFFICE O/P NEW LOW 30 MIN: CPT

## 2019-11-06 RX ORDER — NYSTATIN 100000 1/G
100000 POWDER TOPICAL
Qty: 60 | Refills: 0 | Status: COMPLETED | COMMUNITY
Start: 2019-02-11 | End: 2019-11-06

## 2019-11-06 RX ORDER — IRON/IRON ASP GLY/FA/MV-MIN 38 125-25-1MG
TABLET ORAL
Refills: 0 | Status: ACTIVE | COMMUNITY

## 2019-11-06 NOTE — HISTORY OF PRESENT ILLNESS
[FreeTextEntry8] : Patient is here today for a new patient acute visit. \par She has been dealing with elevated blood pressure since her pregnancy.  Her child is now 10 months old. 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 \par At work she has been getting readings 160/110s.  She does mention at that time she was sick and has been taking cold medicine. \par After she was feeling better she repeated and the pressure was around 150s/ 80s.  \par Occasionally she does experience headaches and dizziness and usually her blood pressure is elevated at that time. \par \par She also has elevated glucose, diabetes. \par Her previous primary care doctor had done blood work about 4-5 months ago and her A1C is 5.9.  \par She checks her sugar at home as needed when she isn't feeling well. \par She did check recently and her number was 128 after eating.

## 2019-11-06 NOTE — PHYSICAL EXAM
[No Acute Distress] : no acute distress [Well Nourished] : well nourished [Well Developed] : well developed [Well-Appearing] : well-appearing [Normal Sclera/Conjunctiva] : normal sclera/conjunctiva [PERRL] : pupils equal round and reactive to light [EOMI] : extraocular movements intact [Normal Outer Ear/Nose] : the outer ears and nose were normal in appearance [Normal Oropharynx] : the oropharynx was normal [No Lymphadenopathy] : no lymphadenopathy [Supple] : supple [Thyroid Normal, No Nodules] : the thyroid was normal and there were no nodules present [No Respiratory Distress] : no respiratory distress  [No Accessory Muscle Use] : no accessory muscle use [Clear to Auscultation] : lungs were clear to auscultation bilaterally [Normal Rate] : normal rate  [Regular Rhythm] : with a regular rhythm [Normal S1, S2] : normal S1 and S2 [No Murmur] : no murmur heard [Soft] : abdomen soft [Non Tender] : non-tender [Non-distended] : non-distended [No Masses] : no abdominal mass palpated [No HSM] : no HSM [Normal Bowel Sounds] : normal bowel sounds [Normal Posterior Cervical Nodes] : no posterior cervical lymphadenopathy [Normal Anterior Cervical Nodes] : no anterior cervical lymphadenopathy [Grossly Normal Strength/Tone] : grossly normal strength/tone [No Rash] : no rash [No Focal Deficits] : no focal deficits [Normal Gait] : normal gait [Normal Affect] : the affect was normal [Normal Insight/Judgement] : insight and judgment were intact

## 2019-11-06 NOTE — ASSESSMENT
[FreeTextEntry1] : Establish care\par - patient to return for CPE and bloodwork\par - up to date with pap\par - up to date with flu shot\par \par Hypertension\par - BP borderline\par - will continue for now with labetalol and lisinopril\par - will return in a few weeks for physical\par - if BP still elevated can consider adding HCTZ \par - continue to monitor at home and bring log\par \par Diabetes\par - currently taking metformin 500mg twice daily\par - last A1C 5.9\par - sugar at home around 100-120s\par - A1C not able to be processed in office today due to machine error\par - will draw blood at upcoming physical

## 2019-11-06 NOTE — HEALTH RISK ASSESSMENT
[No] : In the past 12 months have you used drugs other than those required for medical reasons? No [No falls in past year] : Patient reported no falls in the past year [0] : 2) Feeling down, depressed, or hopeless: Not at all (0) [] : No [[id number]] : 0

## 2019-11-11 ENCOUNTER — APPOINTMENT (OUTPATIENT)
Dept: OBGYN | Facility: CLINIC | Age: 34
End: 2019-11-11
Payer: COMMERCIAL

## 2019-11-11 VITALS
SYSTOLIC BLOOD PRESSURE: 140 MMHG | WEIGHT: 293 LBS | BODY MASS INDEX: 48.82 KG/M2 | HEIGHT: 65 IN | DIASTOLIC BLOOD PRESSURE: 90 MMHG

## 2019-11-11 DIAGNOSIS — Z76.89 PERSONS ENCOUNTERING HEALTH SERVICES IN OTHER SPECIFIED CIRCUMSTANCES: ICD-10-CM

## 2019-11-11 LAB
HCG UR QL: POSITIVE
QUALITY CONTROL: YES

## 2019-11-11 PROCEDURE — 76830 TRANSVAGINAL US NON-OB: CPT

## 2019-11-11 PROCEDURE — 99214 OFFICE O/P EST MOD 30 MIN: CPT | Mod: 25

## 2019-11-11 NOTE — COUNSELING
[Vitamins/Supplements] : vitamins/supplements [Contraception] : contraception [Pregnancy Options] : pregnancy options

## 2019-11-11 NOTE — CHIEF COMPLAINT
[Follow Up] : follow up GYN visit [FreeTextEntry1] : 35 yo G5 P 2022 with LMP 10/1/19 with positive pregnancy test 5 days ago presents for counselling.

## 2019-11-11 NOTE — PROCEDURE
[Amenorrhea] : Amenorrhea [Pelvic Sonogram] : pelvic sonogram [Transvaginal Ultrasound] : transvaginal ultrasound [Present] : uterus present [No Fibroid(s)] : no fibroid(s) [L: ___ cm] : L: [unfilled] cm [W: ___cm] : W: [unfilled] cm [H: ___ cm] : H: [unfilled] cm [FreeTextEntry6] : 15 mm round simple cyst on right ovary. No free fluid. no tenderness in adnexa with exam [FreeTextEntry3] : Endometrium 8 mm but no distinct double ring sign nor IUP.   [FreeTextEntry8] : not seen [FreeTextEntry7] : 2.9 x 1.7 [FreeTextEntry4] : Very early amenorrhea unable to establish IUP yet. No suspicion for ectopic.

## 2019-11-11 NOTE — PHYSICAL EXAM
[Normal] : uterus [Enlarged ___ wks] : enlarged [unfilled] ~Uweeks [No Bleeding] : there was no active vaginal bleeding [Anteversion] : anteverted [Uterine Adnexae] : were not tender and not enlarged

## 2019-11-12 ENCOUNTER — LABORATORY RESULT (OUTPATIENT)
Age: 34
End: 2019-11-12

## 2019-11-13 ENCOUNTER — RESULT REVIEW (OUTPATIENT)
Age: 34
End: 2019-11-13

## 2019-11-13 LAB
ABO + RH PNL BLD: NORMAL
BASOPHILS # BLD AUTO: 0 K/UL
BASOPHILS NFR BLD AUTO: 0 %
EOSINOPHIL # BLD AUTO: 0.17 K/UL
EOSINOPHIL NFR BLD AUTO: 2.6 %
HCT VFR BLD CALC: 32 %
HGB BLD-MCNC: 9.1 G/DL
LYMPHOCYTES # BLD AUTO: 2.46 K/UL
LYMPHOCYTES NFR BLD AUTO: 37.1 %
MAN DIFF?: NORMAL
MCHC RBC-ENTMCNC: 18.9 PG
MCHC RBC-ENTMCNC: 28.4 GM/DL
MCV RBC AUTO: 66.5 FL
MONOCYTES # BLD AUTO: 0.46 K/UL
MONOCYTES NFR BLD AUTO: 6.9 %
NEUTROPHILS # BLD AUTO: 3.55 K/UL
NEUTROPHILS NFR BLD AUTO: 50.8 %
PLATELET # BLD AUTO: 289 K/UL
RBC # BLD: 4.81 M/UL
RBC # FLD: 17.2 %
WBC # FLD AUTO: 6.64 K/UL

## 2019-11-15 ENCOUNTER — ASOB RESULT (OUTPATIENT)
Age: 34
End: 2019-11-15

## 2019-11-15 ENCOUNTER — APPOINTMENT (OUTPATIENT)
Dept: ANTEPARTUM | Facility: CLINIC | Age: 34
End: 2019-11-15
Payer: COMMERCIAL

## 2019-11-15 ENCOUNTER — APPOINTMENT (OUTPATIENT)
Dept: OBGYN | Facility: CLINIC | Age: 34
End: 2019-11-15
Payer: COMMERCIAL

## 2019-11-15 VITALS
HEIGHT: 65 IN | WEIGHT: 293 LBS | DIASTOLIC BLOOD PRESSURE: 94 MMHG | BODY MASS INDEX: 48.82 KG/M2 | SYSTOLIC BLOOD PRESSURE: 160 MMHG

## 2019-11-15 PROCEDURE — 99215 OFFICE O/P EST HI 40 MIN: CPT | Mod: 25

## 2019-11-15 PROCEDURE — 76817 TRANSVAGINAL US OBSTETRIC: CPT

## 2019-11-15 PROCEDURE — 76815 OB US LIMITED FETUS(S): CPT

## 2019-11-15 PROCEDURE — 76801 OB US < 14 WKS SINGLE FETUS: CPT

## 2019-11-15 RX ORDER — NORETHINDRONE 0.35 MG/1
0.35 TABLET ORAL DAILY
Qty: 84 | Refills: 1 | Status: COMPLETED | COMMUNITY
Start: 2019-02-11 | End: 2019-11-15

## 2019-11-15 RX ORDER — LISINOPRIL 40 MG/1
40 TABLET ORAL
Qty: 90 | Refills: 1 | Status: COMPLETED | COMMUNITY
End: 2019-11-15

## 2019-11-16 ENCOUNTER — RESULT REVIEW (OUTPATIENT)
Age: 34
End: 2019-11-16

## 2019-11-16 LAB
C TRACH RRNA SPEC QL NAA+PROBE: NOT DETECTED
HCG SERPL-MCNC: 646 MIU/ML
N GONORRHOEA RRNA SPEC QL NAA+PROBE: NOT DETECTED
SOURCE AMPLIFICATION: NORMAL

## 2019-11-17 ENCOUNTER — LABORATORY RESULT (OUTPATIENT)
Age: 34
End: 2019-11-17

## 2019-11-18 ENCOUNTER — APPOINTMENT (OUTPATIENT)
Dept: FAMILY MEDICINE | Facility: CLINIC | Age: 34
End: 2019-11-18
Payer: COMMERCIAL

## 2019-11-18 VITALS
SYSTOLIC BLOOD PRESSURE: 150 MMHG | HEIGHT: 65 IN | HEART RATE: 95 BPM | BODY MASS INDEX: 48.82 KG/M2 | OXYGEN SATURATION: 97 % | RESPIRATION RATE: 16 BRPM | DIASTOLIC BLOOD PRESSURE: 90 MMHG | WEIGHT: 293 LBS

## 2019-11-18 DIAGNOSIS — Z33.2 ENCOUNTER FOR ELECTIVE TERMINATION OF PREGNANCY: ICD-10-CM

## 2019-11-18 PROCEDURE — 99214 OFFICE O/P EST MOD 30 MIN: CPT

## 2019-11-18 NOTE — PHYSICAL EXAM
[No Acute Distress] : no acute distress [Well Nourished] : well nourished [Well Developed] : well developed [Normal Sclera/Conjunctiva] : normal sclera/conjunctiva [PERRL] : pupils equal round and reactive to light [Normal Outer Ear/Nose] : the outer ears and nose were normal in appearance [Normal Oropharynx] : the oropharynx was normal [Normal TMs] : both tympanic membranes were normal [Supple] : supple [No Lymphadenopathy] : no lymphadenopathy [No Respiratory Distress] : no respiratory distress  [Clear to Auscultation] : lungs were clear to auscultation bilaterally [Normal Rate] : normal rate  [No Accessory Muscle Use] : no accessory muscle use [Regular Rhythm] : with a regular rhythm [Normal S1, S2] : normal S1 and S2 [Soft] : abdomen soft [Non Tender] : non-tender [Non-distended] : non-distended [Normal Bowel Sounds] : normal bowel sounds [Normal Anterior Cervical Nodes] : no anterior cervical lymphadenopathy [No Rash] : no rash [Grossly Normal Strength/Tone] : grossly normal strength/tone [No Focal Deficits] : no focal deficits [Normal Gait] : normal gait [Normal Affect] : the affect was normal [Normal Insight/Judgement] : insight and judgment were intact

## 2019-11-18 NOTE — PHYSICAL EXAM
[Awake] : awake [Alert] : alert [Oriented x3] : oriented to person, place, and time [No Lesions] : no genitalia lesions [Labia Majora] : labia major [Labia Minora] : labia minora [Normal] : clitoris [No Bleeding] : there was no active vaginal bleeding [Acute Distress] : no acute distress [Depressed Mood] : not depressed [Flat Affect] : affect not flat

## 2019-11-20 ENCOUNTER — APPOINTMENT (OUTPATIENT)
Dept: ULTRASOUND IMAGING | Facility: CLINIC | Age: 34
End: 2019-11-20
Payer: COMMERCIAL

## 2019-11-20 ENCOUNTER — OUTPATIENT (OUTPATIENT)
Dept: OUTPATIENT SERVICES | Facility: HOSPITAL | Age: 34
LOS: 1 days | End: 2019-11-20

## 2019-11-20 ENCOUNTER — OUTPATIENT (OUTPATIENT)
Dept: OUTPATIENT SERVICES | Facility: HOSPITAL | Age: 34
LOS: 1 days | End: 2019-11-20
Payer: COMMERCIAL

## 2019-11-20 VITALS
WEIGHT: 293 LBS | OXYGEN SATURATION: 96 % | SYSTOLIC BLOOD PRESSURE: 136 MMHG | HEIGHT: 66 IN | TEMPERATURE: 98 F | DIASTOLIC BLOOD PRESSURE: 83 MMHG | HEART RATE: 81 BPM | RESPIRATION RATE: 16 BRPM

## 2019-11-20 DIAGNOSIS — Z64.0 PROBLEMS RELATED TO UNWANTED PREGNANCY: ICD-10-CM

## 2019-11-20 DIAGNOSIS — O36.80X0 PREGNANCY WITH INCONCLUSIVE FETAL VIABILITY, NOT APPLICABLE OR UNSPECIFIED: ICD-10-CM

## 2019-11-20 DIAGNOSIS — Z98.891 HISTORY OF UTERINE SCAR FROM PREVIOUS SURGERY: Chronic | ICD-10-CM

## 2019-11-20 DIAGNOSIS — I10 ESSENTIAL (PRIMARY) HYPERTENSION: ICD-10-CM

## 2019-11-20 DIAGNOSIS — Z33.2 ENCOUNTER FOR ELECTIVE TERMINATION OF PREGNANCY: ICD-10-CM

## 2019-11-20 DIAGNOSIS — E11.9 TYPE 2 DIABETES MELLITUS WITHOUT COMPLICATIONS: ICD-10-CM

## 2019-11-20 DIAGNOSIS — Z01.818 ENCOUNTER FOR OTHER PREPROCEDURAL EXAMINATION: ICD-10-CM

## 2019-11-20 LAB
ANION GAP SERPL CALC-SCNC: 13 MMOL/L — SIGNIFICANT CHANGE UP (ref 5–17)
BLD GP AB SCN SERPL QL: NEGATIVE — SIGNIFICANT CHANGE UP
BUN SERPL-MCNC: 9 MG/DL — SIGNIFICANT CHANGE UP (ref 7–23)
CALCIUM SERPL-MCNC: 8.7 MG/DL — SIGNIFICANT CHANGE UP (ref 8.4–10.5)
CHLORIDE SERPL-SCNC: 100 MMOL/L — SIGNIFICANT CHANGE UP (ref 96–108)
CO2 SERPL-SCNC: 24 MMOL/L — SIGNIFICANT CHANGE UP (ref 22–31)
CREAT SERPL-MCNC: 0.52 MG/DL — SIGNIFICANT CHANGE UP (ref 0.5–1.3)
GLUCOSE SERPL-MCNC: 145 MG/DL — HIGH (ref 70–99)
HBA1C BLD-MCNC: 7.4 % — HIGH (ref 4–5.6)
HCT VFR BLD CALC: 29.7 % — LOW (ref 34.5–45)
HGB BLD-MCNC: 8.6 G/DL — LOW (ref 11.5–15.5)
MCHC RBC-ENTMCNC: 19.2 PG — LOW (ref 27–34)
MCHC RBC-ENTMCNC: 29 GM/DL — LOW (ref 32–36)
MCV RBC AUTO: 66.3 FL — LOW (ref 80–100)
PLATELET # BLD AUTO: 298 K/UL — SIGNIFICANT CHANGE UP (ref 150–400)
POTASSIUM SERPL-MCNC: 4.1 MMOL/L — SIGNIFICANT CHANGE UP (ref 3.5–5.3)
POTASSIUM SERPL-SCNC: 4.1 MMOL/L — SIGNIFICANT CHANGE UP (ref 3.5–5.3)
RBC # BLD: 4.48 M/UL — SIGNIFICANT CHANGE UP (ref 3.8–5.2)
RBC # FLD: 17.4 % — HIGH (ref 10.3–14.5)
RH IG SCN BLD-IMP: POSITIVE — SIGNIFICANT CHANGE UP
SODIUM SERPL-SCNC: 137 MMOL/L — SIGNIFICANT CHANGE UP (ref 135–145)
WBC # BLD: 9.65 K/UL — SIGNIFICANT CHANGE UP (ref 3.8–10.5)
WBC # FLD AUTO: 9.65 K/UL — SIGNIFICANT CHANGE UP (ref 3.8–10.5)

## 2019-11-20 PROCEDURE — 83036 HEMOGLOBIN GLYCOSYLATED A1C: CPT

## 2019-11-20 PROCEDURE — 86850 RBC ANTIBODY SCREEN: CPT

## 2019-11-20 PROCEDURE — 85027 COMPLETE CBC AUTOMATED: CPT

## 2019-11-20 PROCEDURE — G0463: CPT

## 2019-11-20 PROCEDURE — 80048 BASIC METABOLIC PNL TOTAL CA: CPT

## 2019-11-20 PROCEDURE — 86900 BLOOD TYPING SEROLOGIC ABO: CPT

## 2019-11-20 PROCEDURE — 76856 US EXAM PELVIC COMPLETE: CPT | Mod: 26

## 2019-11-20 PROCEDURE — 86901 BLOOD TYPING SEROLOGIC RH(D): CPT

## 2019-11-20 RX ORDER — LIDOCAINE HCL 20 MG/ML
0.2 VIAL (ML) INJECTION ONCE
Refills: 0 | Status: DISCONTINUED | OUTPATIENT
Start: 2019-11-22 | End: 2019-12-17

## 2019-11-20 RX ORDER — SODIUM CHLORIDE 9 MG/ML
3 INJECTION INTRAMUSCULAR; INTRAVENOUS; SUBCUTANEOUS EVERY 8 HOURS
Refills: 0 | Status: DISCONTINUED | OUTPATIENT
Start: 2019-11-22 | End: 2019-12-17

## 2019-11-20 NOTE — H&P PST ADULT - NSANTHOSAYNRD_GEN_A_CORE
No. TAYLA screening performed.  STOP BANG Legend: 0-2 = LOW Risk; 3-4 = INTERMEDIATE Risk; 5-8 = HIGH Risk

## 2019-11-20 NOTE — H&P PST ADULT - NSICDXPASTMEDICALHX_GEN_ALL_CORE_FT
PAST MEDICAL HISTORY:  HTN (hypertension)     Iron deficiency anemia     Type 2 diabetes mellitus PAST MEDICAL HISTORY:  HTN (hypertension)     Iron deficiency anemia     Morbid obesity with BMI of 50.0-59.9, adult     Spontaneous  '10    Type 2 diabetes mellitus PAST MEDICAL HISTORY:  HTN (hypertension)     Iron deficiency anemia     Morbid obesity with BMI of 50.0-59.9, adult     Multiparity     Spontaneous  '10    Type 2 diabetes mellitus

## 2019-11-20 NOTE — H&P PST ADULT - NSICDXPASTSURGICALHX_GEN_ALL_CORE_FT
PAST SURGICAL HISTORY:  No significant past surgical history PAST SURGICAL HISTORY:  S/P  section '  and 2018

## 2019-11-20 NOTE — H&P PST ADULT - NSICDXPROBLEM_GEN_ALL_CORE_FT
PROBLEM DIAGNOSES  Problem: Unwanted pregnancy  Assessment and Plan: D+C  for Termination of Pregnancy    Problem: HTN (hypertension)  Assessment and Plan: Continue meds    Problem: Type 2 diabetes mellitus  Assessment and Plan: Continue monitoring. Fingerstick glucose preop day of surgery

## 2019-11-20 NOTE — H&P PST ADULT - HISTORY OF PRESENT ILLNESS
This is a 34 year old female,  with PMH:  TAYLA per criteria, Morbid Obesity: current BMI 56.6, HTN, Type 2 Diabetes, Iron Deficiency Anemia. Current dx: I.U.P. approximately 7 weeks. Desires Termination of Pregnancy. Scheduled: D+C for Termination of Pregnancy.

## 2019-11-20 NOTE — H&P PST ADULT - NSICDXFAMILYHX_GEN_ALL_CORE_FT
FAMILY HISTORY:  Family history of type 2 diabetes mellitus in mother  FH: HTN (hypertension), Father

## 2019-11-21 VITALS
HEART RATE: 81 BPM | TEMPERATURE: 98 F | OXYGEN SATURATION: 96 % | RESPIRATION RATE: 16 BRPM | SYSTOLIC BLOOD PRESSURE: 136 MMHG | HEIGHT: 66 IN | DIASTOLIC BLOOD PRESSURE: 83 MMHG | WEIGHT: 293 LBS

## 2019-11-21 NOTE — ASSESSMENT
[As per surgery] : as per surgery [Patient Optimized for Surgery] : Patient optimized for surgery [FreeTextEntry4] : Patient is here today for clearance for D&C. \par Presurgical labs show patient is anemic at Hg 8.6.  She does have history of anemia with pregnancy.  Patient is taking iron.  If needed during procedure, blood transfusion can be done if indicated.  Comprehensive panel within normal limtis.  A1c 7.4 and patient is on metformin.   \par Patient has elevated blood pressure in the office.  She will resume lisinopril 40mg.  \par Patient is optimized for procedure.  She should follow up in office 1- 2 weeks after procedure to follow up on anemia.  During surgery, blood products can be used as needed.

## 2019-11-21 NOTE — HISTORY OF PRESENT ILLNESS
[No Pertinent Cardiac History] : no history of aortic stenosis, atrial fibrillation, coronary artery disease, recent myocardial infarction, or implantable device/pacemaker [No Pertinent Pulmonary History] : no history of asthma, COPD, sleep apnea, or smoking [No Adverse Anesthesia Reaction] : no adverse anesthesia reaction in self or family member [Diabetes] : diabetes [(Patient denies any chest pain, claudication, dyspnea on exertion, orthopnea, palpitations or syncope)] : Patient denies any chest pain, claudication, dyspnea on exertion, orthopnea, palpitations or syncope [Moderate (4-6 METs)] : Moderate (4-6 METs) [Chronic Anticoagulation] : no chronic anticoagulation [Chronic Kidney Disease] : no chronic kidney disease [FreeTextEntry1] : d&C [FreeTextEntry2] : 11/22/19 [FreeTextEntry3] : Dr. Pratt [FreeTextEntry4] : She will be having the procedure at the ambulatory surgery center at [place].

## 2019-11-21 NOTE — PLAN
[FreeTextEntry1] : Hypertension\par - Bp elevated\par - continue labetalol\par - resume lisinopril to reduce BP\par \par Patient to follow up 1-2 weeks after procedure for BP check and anemia check.

## 2019-11-21 NOTE — RESULTS/DATA
[] : results reviewed [de-identified] : HCG positive - known, patient going for D&C\par A1c - 7.4, patient to continue with metformin and work on diet/ exercise

## 2019-11-21 NOTE — PRE-ANESTHESIA EVALUATION ADULT - BSA (M2)
Patient in testing, [place] spoke with daughter Carla who assisted in initial DC assessment. Patient lives with supportive spouse.    Dakin,Melvin Spouse     [phone]     Patient is independent and drives, appreciates AM appointments.    Follow-up With  Details  Why  Contact Info   Aaron Tim MD  Go on 7/3/2018  @ 9:15am  [address]  [address]  [place] [address]  [phone]             06/20/18 1110   Discharge Assessment   Assessment Type Discharge Planning Assessment   Confirmed/corrected address and phone number on facesheet? Yes   Assessment information obtained from? Patient   Prior to hospitilization cognitive status: Alert/Oriented   Prior to hospitalization functional status: Independent   Current cognitive status: Alert/Oriented   Current Functional Status: Independent   Facility Arrived From: home   Lives With spouse   Able to Return to Prior Arrangements yes   Is patient able to care for self after discharge? Yes   Who are your caregiver(s) and their phone number(s)? Dakin,Melvin Spouse     [phone]    Patient's perception of discharge disposition home or selfcare   Readmission Within The Last 30 Days no previous admission in last 30 days   Patient currently being followed by outpatient case management? No   Patient currently receives any other outside agency services? No   Equipment Currently Used at Home none   Do you have any problems affording any of your prescribed medications? No   Is the patient taking medications as prescribed? yes   Does the patient have transportation home? Yes   Transportation Available family or friend will provide   Dialysis Name and Scheduled days n/a   Does the patient receive services at the [facility]mad[facility] Clinic? No   Discharge Plan A Home with family   Discharge Plan B Home Health;Home with family   Patient/Family In Agreement With Plan yes      2.54

## 2019-11-21 NOTE — PRE-ANESTHESIA EVALUATION ADULT - NSANTHPMHFT_GEN_ALL_CORE
34 year old female,  with PMH:  TAYLA per criteria, Morbid Obesity: current BMI 56.6, HTN, Type 2 Diabetes, Iron Deficiency Anemia. Current dx: I.U.P. approximately 7 weeks. Desires Termination of Pregnancy. S/P  section '  and 2018.

## 2019-11-22 ENCOUNTER — RESULT REVIEW (OUTPATIENT)
Age: 34
End: 2019-11-22

## 2019-11-22 ENCOUNTER — APPOINTMENT (OUTPATIENT)
Dept: OBGYN | Facility: CLINIC | Age: 34
End: 2019-11-22

## 2019-11-22 ENCOUNTER — OUTPATIENT (OUTPATIENT)
Dept: OUTPATIENT SERVICES | Facility: HOSPITAL | Age: 34
LOS: 1 days | End: 2019-11-22
Payer: COMMERCIAL

## 2019-11-22 VITALS
TEMPERATURE: 97 F | HEART RATE: 86 BPM | OXYGEN SATURATION: 98 % | DIASTOLIC BLOOD PRESSURE: 64 MMHG | SYSTOLIC BLOOD PRESSURE: 124 MMHG | RESPIRATION RATE: 17 BRPM

## 2019-11-22 DIAGNOSIS — Z98.891 HISTORY OF UTERINE SCAR FROM PREVIOUS SURGERY: Chronic | ICD-10-CM

## 2019-11-22 DIAGNOSIS — Z33.2 ENCOUNTER FOR ELECTIVE TERMINATION OF PREGNANCY: ICD-10-CM

## 2019-11-22 DIAGNOSIS — Z01.818 ENCOUNTER FOR OTHER PREPROCEDURAL EXAMINATION: ICD-10-CM

## 2019-11-22 LAB
BLD GP AB SCN SERPL QL: NEGATIVE — SIGNIFICANT CHANGE UP
GLUCOSE BLDC GLUCOMTR-MCNC: 104 MG/DL — HIGH (ref 70–99)
HCG SERPL-ACNC: 1420 MIU/ML — HIGH
RH IG SCN BLD-IMP: POSITIVE — SIGNIFICANT CHANGE UP

## 2019-11-22 PROCEDURE — 86901 BLOOD TYPING SEROLOGIC RH(D): CPT

## 2019-11-22 PROCEDURE — 88305 TISSUE EXAM BY PATHOLOGIST: CPT | Mod: 26

## 2019-11-22 PROCEDURE — 88305 TISSUE EXAM BY PATHOLOGIST: CPT

## 2019-11-22 PROCEDURE — 59840 INDUCED ABORTION D&C: CPT

## 2019-11-22 PROCEDURE — 86900 BLOOD TYPING SEROLOGIC ABO: CPT

## 2019-11-22 PROCEDURE — 58300 INSERT INTRAUTERINE DEVICE: CPT

## 2019-11-22 PROCEDURE — 86850 RBC ANTIBODY SCREEN: CPT

## 2019-11-22 PROCEDURE — 84702 CHORIONIC GONADOTROPIN TEST: CPT

## 2019-11-22 PROCEDURE — 82962 GLUCOSE BLOOD TEST: CPT

## 2019-11-22 PROCEDURE — 76998 US GUIDE INTRAOP: CPT | Mod: 26

## 2019-11-22 RX ORDER — ACETAMINOPHEN 500 MG
2 TABLET ORAL
Qty: 0 | Refills: 0 | DISCHARGE

## 2019-11-22 RX ORDER — IBUPROFEN 200 MG
1 TABLET ORAL
Qty: 0 | Refills: 0 | DISCHARGE

## 2019-11-22 RX ORDER — SODIUM CHLORIDE 9 MG/ML
1000 INJECTION, SOLUTION INTRAVENOUS
Refills: 0 | Status: DISCONTINUED | OUTPATIENT
Start: 2019-11-22 | End: 2019-12-17

## 2019-11-22 RX ORDER — ONDANSETRON 8 MG/1
4 TABLET, FILM COATED ORAL ONCE
Refills: 0 | Status: DISCONTINUED | OUTPATIENT
Start: 2019-11-22 | End: 2019-11-22

## 2019-11-22 RX ORDER — METFORMIN HYDROCHLORIDE 850 MG/1
1 TABLET ORAL
Qty: 0 | Refills: 0 | DISCHARGE

## 2019-11-22 RX ORDER — LISINOPRIL 2.5 MG/1
1 TABLET ORAL
Qty: 0 | Refills: 0 | DISCHARGE

## 2019-11-22 RX ORDER — HYDROMORPHONE HYDROCHLORIDE 2 MG/ML
0.5 INJECTION INTRAMUSCULAR; INTRAVENOUS; SUBCUTANEOUS
Refills: 0 | Status: DISCONTINUED | OUTPATIENT
Start: 2019-11-22 | End: 2019-11-22

## 2019-11-22 RX ORDER — FERROUS SULFATE 325(65) MG
1 TABLET ORAL
Qty: 0 | Refills: 0 | DISCHARGE

## 2019-11-22 NOTE — ASU DISCHARGE PLAN (ADULT/PEDIATRIC) - CALL YOUR DOCTOR IF YOU HAVE ANY OF THE FOLLOWING:
Pain not relieved by Medications/Numbness, tingling, color or temperature change to extremity/Increased irritability or sluggishness/Bleeding that does not stop/Swelling that gets worse/Inability to tolerate liquids or foods/Wound/Surgical Site with redness, or foul smelling discharge or pus/Fever greater than (need to indicate Fahrenheit or Celsius)/Excessive diarrhea/Nausea and vomiting that does not stop/Unable to urinate

## 2019-11-22 NOTE — PRE-ANESTHESIA EVALUATION ADULT - NSANTHPEFT_GEN_ALL_CORE
GENERAL: NAD  HEAD:  Atraumatic, Normocephalic  CHEST/LUNG: Clear to auscultation bilaterally  HEART: Normal S1/S2  PSYCH: AAOx3  NEUROLOGY: non-focal  SKIN: No obvious rashes or lesions

## 2019-11-22 NOTE — BRIEF OPERATIVE NOTE - NSICDXBRIEFPOSTOP_GEN_ALL_CORE_FT
POST-OP DIAGNOSIS:  Hypertension 22-Nov-2019 18:11:52  Ngozi Martinez  Obesity, morbid, BMI 50 or higher 22-Nov-2019 18:11:38  Ngozi Martinez  Unplanned pregnancy 22-Nov-2019 18:11:31  Ngozi Martinez POST-OP DIAGNOSIS:  Hypertension 22-Nov-2019 18:11:52  Ngozi Martinez  Obesity, morbid, BMI 50 or higher 22-Nov-2019 18:11:38  Ngozi Martinez  Unplanned pregnancy 22-Nov-2019 18:11:31 1. IUP @ 7 1/7 weeks  2. unplanned, undesired IUP Ngozi Martinez

## 2019-11-22 NOTE — BRIEF OPERATIVE NOTE - NSICDXBRIEFPROCEDURE_GEN_ALL_CORE_FT
PROCEDURES:  Insertion of Mirena IUD 22-Nov-2019 18:10:40  Ngozi Martinez  Dilation and curettage of uterus using suction with ultrasound guidance 22-Nov-2019 18:10:23  Ngozi Martinez PROCEDURES:  Dilation and curettage for pregnancy termination 26-Nov-2019 10:31:17 1. exaination under anesthesia  2. dilation and curettage under ultrasound guidance Marlene Pratt  Insertion of Mirena IUD 22-Nov-2019 18:10:40  Ngozi Martinez

## 2019-11-22 NOTE — ASU DISCHARGE PLAN (ADULT/PEDIATRIC) - CARE PROVIDER_API CALL
Marlene Pratt)  OBSN  General  [address] [place] [id number]  Phone: [phone]  Fax: [phone]  Follow Up Time:

## 2019-11-22 NOTE — BRIEF OPERATIVE NOTE - OPERATION/FINDINGS
Unable to palpate uterus on bimanual exam given patient's body habitus. Uterus visualized on ultrasound after backfilling bladder, small sac seen at uterine fundus. Suction curettage performed under ultrasound guidance. Products inspected - sac and villi appreciated. Thin endometrial stripe noted at end of procedure. Mirena IUD LOT#[id number] exp. 1/2022 placed under ultrasound guidance. Rh status: positive. examination under anesthesia limited to body habitus.  on ultrasound, uterus visualized on ultrasound after backfilling bladder, noted to be anteverted. small sac seen at uterine fundus c/w pre operative ultrasounds. procedure performed under direct ultrasound guidance. POC inspected - sac and villi appreciated. Thin endometrial stripe noted at end of procedure. Mirena IUD LOT#[id number] exp. 1/2022 placed to uterine fundus under ultrasound guidance. B+

## 2019-11-22 NOTE — PRE-ANESTHESIA EVALUATION ADULT - NSANTHPMHFT_GEN_ALL_CORE
Patient states she is between 7 and 8 weeks of pregnancy  C/S x 2 - both neuraxial, but for the second one patient states she felt sharp pain towards the end of the surgery    Colonoscopy

## 2019-11-22 NOTE — BRIEF OPERATIVE NOTE - NSICDXBRIEFPREOP_GEN_ALL_CORE_FT
PRE-OP DIAGNOSIS:  Hypertension 22-Nov-2019 18:11:14  Ngozi Martinez  Obesity, morbid, BMI 50 or higher 22-Nov-2019 18:11:01  Ngozi Martinez  Unplanned pregnancy 22-Nov-2019 18:10:50  Ngozi Martinez PRE-OP DIAGNOSIS:  Hypertension 22-Nov-2019 18:11:14  Ngozi Martinez  Obesity, morbid, BMI 50 or higher 22-Nov-2019 18:11:01  Ngozi Martinez  Unplanned pregnancy 22-Nov-2019 18:10:50 1. PUL @ 7 1/7 weeks  2. unplanned, undesired pregnancy Ngozi Martinez

## 2019-11-24 ENCOUNTER — EMERGENCY (EMERGENCY)
Facility: HOSPITAL | Age: 34
LOS: 1 days | Discharge: HOME | End: 2019-11-24
Attending: EMERGENCY MEDICINE
Payer: COMMERCIAL

## 2019-11-24 VITALS
RESPIRATION RATE: 20 BRPM | SYSTOLIC BLOOD PRESSURE: 164 MMHG | OXYGEN SATURATION: 96 % | HEIGHT: 65 IN | DIASTOLIC BLOOD PRESSURE: 100 MMHG | HEART RATE: 86 BPM | TEMPERATURE: 98 F | WEIGHT: 293 LBS

## 2019-11-24 DIAGNOSIS — Z98.891 HISTORY OF UTERINE SCAR FROM PREVIOUS SURGERY: Chronic | ICD-10-CM

## 2019-11-24 PROBLEM — Z64.1 PROBLEMS RELATED TO MULTIPARITY: Chronic | Status: ACTIVE | Noted: 2019-11-20

## 2019-11-24 PROBLEM — D50.9 IRON DEFICIENCY ANEMIA, UNSPECIFIED: Chronic | Status: ACTIVE | Noted: 2019-11-20

## 2019-11-24 PROBLEM — E66.01 MORBID (SEVERE) OBESITY DUE TO EXCESS CALORIES: Chronic | Status: ACTIVE | Noted: 2019-11-20

## 2019-11-24 PROBLEM — E11.9 TYPE 2 DIABETES MELLITUS WITHOUT COMPLICATIONS: Chronic | Status: ACTIVE | Noted: 2019-11-20

## 2019-11-24 PROBLEM — O03.9 COMPLETE OR UNSPECIFIED SPONTANEOUS ABORTION WITHOUT COMPLICATION: Chronic | Status: ACTIVE | Noted: 2019-11-20

## 2019-11-24 LAB
ALBUMIN SERPL ELPH-MCNC: 3.8 G/DL — SIGNIFICANT CHANGE UP (ref 3.3–5.2)
ALP SERPL-CCNC: 77 U/L — SIGNIFICANT CHANGE UP (ref 40–120)
ALT FLD-CCNC: 28 U/L — SIGNIFICANT CHANGE UP
ANION GAP SERPL CALC-SCNC: 14 MMOL/L — SIGNIFICANT CHANGE UP (ref 5–17)
APPEARANCE UR: CLEAR — SIGNIFICANT CHANGE UP
APTT BLD: 26.7 SEC — LOW (ref 27.5–36.3)
AST SERPL-CCNC: 36 U/L — HIGH
BACTERIA # UR AUTO: ABNORMAL
BASOPHILS # BLD AUTO: 0.08 K/UL — SIGNIFICANT CHANGE UP (ref 0–0.2)
BASOPHILS NFR BLD AUTO: 0.7 % — SIGNIFICANT CHANGE UP (ref 0–2)
BILIRUB SERPL-MCNC: 0.4 MG/DL — SIGNIFICANT CHANGE UP (ref 0.4–2)
BILIRUB UR-MCNC: NEGATIVE — SIGNIFICANT CHANGE UP
BUN SERPL-MCNC: 14 MG/DL — SIGNIFICANT CHANGE UP (ref 8–20)
CALCIUM SERPL-MCNC: 8.7 MG/DL — SIGNIFICANT CHANGE UP (ref 8.6–10.2)
CHLORIDE SERPL-SCNC: 101 MMOL/L — SIGNIFICANT CHANGE UP (ref 98–107)
CK MB CFR SERPL CALC: 5.7 NG/ML — SIGNIFICANT CHANGE UP (ref 0–6.7)
CK SERPL-CCNC: 793 U/L — HIGH (ref 25–170)
CO2 SERPL-SCNC: 24 MMOL/L — SIGNIFICANT CHANGE UP (ref 22–29)
COLOR SPEC: YELLOW — SIGNIFICANT CHANGE UP
COMMENT - URINE: SIGNIFICANT CHANGE UP
CREAT SERPL-MCNC: 0.46 MG/DL — LOW (ref 0.5–1.3)
DIFF PNL FLD: ABNORMAL
EOSINOPHIL # BLD AUTO: 0.2 K/UL — SIGNIFICANT CHANGE UP (ref 0–0.5)
EOSINOPHIL NFR BLD AUTO: 1.7 % — SIGNIFICANT CHANGE UP (ref 0–6)
EPI CELLS # UR: ABNORMAL
GLUCOSE SERPL-MCNC: 102 MG/DL — SIGNIFICANT CHANGE UP (ref 70–115)
GLUCOSE UR QL: NEGATIVE MG/DL — SIGNIFICANT CHANGE UP
HCT VFR BLD CALC: 30.5 % — LOW (ref 34.5–45)
HGB BLD-MCNC: 8.5 G/DL — LOW (ref 11.5–15.5)
IMM GRANULOCYTES NFR BLD AUTO: 0.3 % — SIGNIFICANT CHANGE UP (ref 0–1.5)
INR BLD: 0.97 RATIO — SIGNIFICANT CHANGE UP (ref 0.88–1.16)
KETONES UR-MCNC: ABNORMAL
LEUKOCYTE ESTERASE UR-ACNC: ABNORMAL
LYMPHOCYTES # BLD AUTO: 28.8 % — SIGNIFICANT CHANGE UP (ref 13–44)
LYMPHOCYTES # BLD AUTO: 3.32 K/UL — HIGH (ref 1–3.3)
MCHC RBC-ENTMCNC: 18.6 PG — LOW (ref 27–34)
MCHC RBC-ENTMCNC: 27.9 GM/DL — LOW (ref 32–36)
MCV RBC AUTO: 66.9 FL — LOW (ref 80–100)
MONOCYTES # BLD AUTO: 1.03 K/UL — HIGH (ref 0–0.9)
MONOCYTES NFR BLD AUTO: 8.9 % — SIGNIFICANT CHANGE UP (ref 2–14)
NEUTROPHILS # BLD AUTO: 6.86 K/UL — SIGNIFICANT CHANGE UP (ref 1.8–7.4)
NEUTROPHILS NFR BLD AUTO: 59.6 % — SIGNIFICANT CHANGE UP (ref 43–77)
NITRITE UR-MCNC: NEGATIVE — SIGNIFICANT CHANGE UP
PH UR: 5 — SIGNIFICANT CHANGE UP (ref 5–8)
PLATELET # BLD AUTO: 329 K/UL — SIGNIFICANT CHANGE UP (ref 150–400)
POTASSIUM SERPL-MCNC: 4 MMOL/L — SIGNIFICANT CHANGE UP (ref 3.5–5.3)
POTASSIUM SERPL-SCNC: 4 MMOL/L — SIGNIFICANT CHANGE UP (ref 3.5–5.3)
PROT SERPL-MCNC: 7.5 G/DL — SIGNIFICANT CHANGE UP (ref 6.6–8.7)
PROT UR-MCNC: 30 MG/DL
PROTHROM AB SERPL-ACNC: 11.2 SEC — SIGNIFICANT CHANGE UP (ref 10–12.9)
RBC # BLD: 4.56 M/UL — SIGNIFICANT CHANGE UP (ref 3.8–5.2)
RBC # FLD: 17.2 % — HIGH (ref 10.3–14.5)
RBC CASTS # UR COMP ASSIST: ABNORMAL /HPF (ref 0–4)
SODIUM SERPL-SCNC: 139 MMOL/L — SIGNIFICANT CHANGE UP (ref 135–145)
SP GR SPEC: 1.02 — SIGNIFICANT CHANGE UP (ref 1.01–1.02)
TROPONIN T SERPL-MCNC: <0.01 NG/ML — SIGNIFICANT CHANGE UP (ref 0–0.06)
UROBILINOGEN FLD QL: NEGATIVE MG/DL — SIGNIFICANT CHANGE UP
WBC # BLD: 11.53 K/UL — HIGH (ref 3.8–10.5)
WBC # FLD AUTO: 11.53 K/UL — HIGH (ref 3.8–10.5)
WBC UR QL: SIGNIFICANT CHANGE UP

## 2019-11-24 PROCEDURE — 81001 URINALYSIS AUTO W/SCOPE: CPT

## 2019-11-24 PROCEDURE — 85610 PROTHROMBIN TIME: CPT

## 2019-11-24 PROCEDURE — 87086 URINE CULTURE/COLONY COUNT: CPT

## 2019-11-24 PROCEDURE — 99284 EMERGENCY DEPT VISIT MOD MDM: CPT

## 2019-11-24 PROCEDURE — 99283 EMERGENCY DEPT VISIT LOW MDM: CPT

## 2019-11-24 PROCEDURE — 85730 THROMBOPLASTIN TIME PARTIAL: CPT

## 2019-11-24 PROCEDURE — 80053 COMPREHEN METABOLIC PANEL: CPT

## 2019-11-24 PROCEDURE — 84484 ASSAY OF TROPONIN QUANT: CPT

## 2019-11-24 PROCEDURE — 82550 ASSAY OF CK (CPK): CPT

## 2019-11-24 PROCEDURE — 36415 COLL VENOUS BLD VENIPUNCTURE: CPT

## 2019-11-24 PROCEDURE — 85027 COMPLETE CBC AUTOMATED: CPT

## 2019-11-24 PROCEDURE — 93010 ELECTROCARDIOGRAM REPORT: CPT

## 2019-11-24 PROCEDURE — 93005 ELECTROCARDIOGRAM TRACING: CPT

## 2019-11-24 PROCEDURE — 82553 CREATINE MB FRACTION: CPT

## 2019-11-24 RX ORDER — IBUPROFEN 200 MG
600 TABLET ORAL ONCE
Refills: 0 | Status: COMPLETED | OUTPATIENT
Start: 2019-11-24 | End: 2019-11-24

## 2019-11-24 RX ADMIN — Medication 600 MILLIGRAM(S): at 16:57

## 2019-11-24 NOTE — ED ADULT NURSE NOTE - NSIMPLEMENTINTERV_GEN_ALL_ED
Implemented All Universal Safety Interventions:  Gerald to call system. Call bell, personal items and telephone within reach. Instruct patient to call for assistance. Room bathroom lighting operational. Non-slip footwear when patient is off stretcher. Physically safe environment: no spills, clutter or unnecessary equipment. Stretcher in lowest position, wheels locked, appropriate side rails in place.

## 2019-11-24 NOTE — ED PROVIDER NOTE - PATIENT PORTAL LINK FT
You can access the FollowMyHealth Patient Portal offered by [facility] by registering at the following website: [email or web address]. By joining [facility]’s FollowMyHealth portal, you will also be able to view your health information using other applications (apps) compatible with our system.

## 2019-11-24 NOTE — ED ADULT NURSE NOTE - PMH
HTN (hypertension)    Iron deficiency anemia    Morbid obesity with BMI of 50.0-59.9, adult    Multiparity    Spontaneous   '10  Type 2 diabetes mellitus

## 2019-11-24 NOTE — ED PROVIDER NOTE - PHYSICAL EXAMINATION
Gen: Alert, NAD  Head: NC, AT, PERRL, EOMI, normal lids/conjunctiva  ENT: normal hearing, patent oropharynx without erythema/exudate, uvula midline  Neck: +supple, no tenderness/meningismus/JVD, +Trachea midline  Pulm: Bilateral BS, normal resp effort, no wheeze/stridor/retractions  CV: RRR, no M/R/G, +dist pulses  Abd: soft, NT/ND, +BS, no hepatosplenomegaly  Mskel: no edema/erythema/cyanosis  Skin: no rash  Neuro: AAOx3, small area of paresthesia on right upper thigh in circular area over lateral surface, with normal sensatoin in remainder of thigh, otherwise no gross sensory/motor deficits, CN 2-12 intact

## 2019-11-24 NOTE — ED PROVIDER NOTE - OBJECTIVE STATEMENT
Pertinent PMH/PSH/FHx/SHx and Review of Systems contained within:  Patient presents to the ED for whole body fatigue.  PMh of HTN, NIDDM2.  Patient had D&C 2 days ago under generla anesthesia and now presents with "my arms and legs just feel so tired."  VSS.  Non toxic.  Well appearing. Otherwise baseline.  no other concerns.  No aggravating or relieving factors. No other pertinent PMH.  No other pertinent PSH.  No other pertinent FHx.  Patient denies EtOH/tobacco/illicit substance use. No fever/chills, No photophobia/eye pain/changes in vision, No ear pain/sore throat/dysphagia, No chest pain/palpitations, no SOB/cough/wheeze/stridor, No abdominal pain, No N/V/D, no dysuria/frequency/discharge, No neck/back pain, no rash, no other changes in neurological status/function.

## 2019-11-24 NOTE — ED PROVIDER NOTE - CLINICAL SUMMARY MEDICAL DECISION MAKING FREE TEXT BOX
Patient presents with weaknes and fatigue.  Lab values do not require emergent intervention. feeling betteri n ED.  will f/u.  Uneventful ED observation period. Non toxic.  Well appearing. smiling.  Discussed results and outcome of testing with the patient.  Patient advised to please follow up with their primary care doctor within the next 24 hours and return to the Emergency Department for worsening symptoms or any other concerns.  Patient advised that their doctor may call  to follow up on the specific results of the tests performed today in the emergency department.

## 2019-11-25 LAB
CULTURE RESULTS: SIGNIFICANT CHANGE UP
SPECIMEN SOURCE: SIGNIFICANT CHANGE UP

## 2019-11-28 LAB — SURGICAL PATHOLOGY STUDY: SIGNIFICANT CHANGE UP

## 2019-11-30 ENCOUNTER — RESULT REVIEW (OUTPATIENT)
Age: 34
End: 2019-11-30

## 2019-11-30 LAB — HCG SERPL-MCNC: 21 MIU/ML

## 2019-12-03 ENCOUNTER — APPOINTMENT (OUTPATIENT)
Dept: FAMILY MEDICINE | Facility: CLINIC | Age: 34
End: 2019-12-03
Payer: COMMERCIAL

## 2019-12-03 VITALS
SYSTOLIC BLOOD PRESSURE: 160 MMHG | OXYGEN SATURATION: 99 % | BODY MASS INDEX: 56.58 KG/M2 | DIASTOLIC BLOOD PRESSURE: 110 MMHG | HEART RATE: 93 BPM | RESPIRATION RATE: 16 BRPM | WEIGHT: 293 LBS

## 2019-12-03 DIAGNOSIS — Z13.220 ENCOUNTER FOR SCREENING FOR LIPOID DISORDERS: ICD-10-CM

## 2019-12-03 DIAGNOSIS — Z11.4 ENCOUNTER FOR SCREENING FOR HUMAN IMMUNODEFICIENCY VIRUS [HIV]: ICD-10-CM

## 2019-12-03 DIAGNOSIS — Z01.818 ENCOUNTER FOR OTHER PREPROCEDURAL EXAMINATION: ICD-10-CM

## 2019-12-03 DIAGNOSIS — O36.80X0 PREGNANCY WITH INCONCLUSIVE FETAL VIABILITY, NOT APPLICABLE OR UNSPECIFIED: ICD-10-CM

## 2019-12-03 DIAGNOSIS — Z87.42 PERSONAL HISTORY OF OTHER DISEASES OF THE FEMALE GENITAL TRACT: ICD-10-CM

## 2019-12-03 DIAGNOSIS — Z13.29 ENCOUNTER FOR SCREENING FOR OTHER SUSPECTED ENDOCRINE DISORDER: ICD-10-CM

## 2019-12-03 DIAGNOSIS — Z13.0 ENCOUNTER FOR SCREENING FOR DISEASES OF THE BLOOD AND BLOOD-FORMING ORGANS AND CERTAIN DISORDERS INVOLVING THE IMMUNE MECHANISM: ICD-10-CM

## 2019-12-03 DIAGNOSIS — Z00.00 ENCOUNTER FOR GENERAL ADULT MEDICAL EXAMINATION W/OUT ABNORMAL FINDINGS: ICD-10-CM

## 2019-12-03 DIAGNOSIS — B96.89 ACUTE VAGINITIS: ICD-10-CM

## 2019-12-03 DIAGNOSIS — N76.0 ACUTE VAGINITIS: ICD-10-CM

## 2019-12-03 DIAGNOSIS — Z13.39 ENCOUNTER FOR SCREENING EXAM FOR OTHER MENTAL HEALTH AND BEHAVIORAL DISORDERS: ICD-10-CM

## 2019-12-03 PROCEDURE — G0442 ANNUAL ALCOHOL SCREEN 15 MIN: CPT | Mod: NC

## 2019-12-03 PROCEDURE — 36415 COLL VENOUS BLD VENIPUNCTURE: CPT

## 2019-12-03 PROCEDURE — 99395 PREV VISIT EST AGE 18-39: CPT | Mod: 25

## 2019-12-03 PROCEDURE — G0444 DEPRESSION SCREEN ANNUAL: CPT | Mod: NC,59

## 2019-12-03 RX ORDER — LISINOPRIL 40 MG/1
40 TABLET ORAL
Refills: 0 | Status: COMPLETED | COMMUNITY
End: 2019-12-03

## 2019-12-03 NOTE — HEALTH RISK ASSESSMENT
[Good] : ~his/her~  mood as  good [No] : No [No falls in past year] : Patient reported no falls in the past year [0] : 2) Feeling down, depressed, or hopeless: Not at all (0) [Patient reported PAP Smear was normal] : Patient reported PAP Smear was normal [HIV Test offered] : HIV Test offered [With Family] : lives with family [None] : None [# Of Children ___] : has [unfilled] children [] :  [Employed] : employed [Feels Safe at Home] : Feels safe at home [Sexually Active] : sexually active [Fully functional (bathing, dressing, toileting, transferring, walking, feeding)] : Fully functional (bathing, dressing, toileting, transferring, walking, feeding) [Fully functional (using the telephone, shopping, preparing meals, housekeeping, doing laundry, using] : Fully functional and needs no help or supervision to perform IADLs (using the telephone, shopping, preparing meals, housekeeping, doing laundry, using transportation, managing medications and managing finances) [Smoke Detector] : smoke detector [Carbon Monoxide Detector] : carbon monoxide detector [Seat Belt] :  uses seat belt [Sunscreen] : uses sunscreen [With Patient/Caregiver] : With Patient/Caregiver [Name: ___] : Health Care Proxy's Name: [unfilled]  [Relationship: ___] : Relationship: [unfilled] [] : No [[id number]] : 0 [Change in mental status noted] : No change in mental status noted [Language] : denies difficulty with language [Behavior] : denies difficulty with behavior [Reasoning] : denies difficulty with reasoning [Reports changes in hearing] : Reports no changes in hearing [High Risk Behavior] : no high risk behavior [Reports changes in vision] : Reports no changes in vision [Reports changes in dental health] : Reports no changes in dental health [PapSmearDate] : 4/2019 [FreeTextEntry2] : part time [AdvancecareDate] : 12/2019

## 2019-12-03 NOTE — ASSESSMENT
[FreeTextEntry1] : Health maintenance\par - comprehensive labs\par - up to date with pap\par - up to date with flu shot\par - negative depression screen\par - negative alcohol screen\par \par Diabetes\par - has been well controlled with metformin\par - will check A1C today\par \par Hypertension\par - uncontrolled\par - admits to missing two doses of her BP meds\par - asymptomatic\par - continue labetalol\par - change to losartan with HCTZ combo \par - follow up in 2 weeks for BP check

## 2019-12-03 NOTE — PHYSICAL EXAM
[No Acute Distress] : no acute distress [Well Nourished] : well nourished [Normal Sclera/Conjunctiva] : normal sclera/conjunctiva [Well Developed] : well developed [PERRL] : pupils equal round and reactive to light [Normal Outer Ear/Nose] : the outer ears and nose were normal in appearance [EOMI] : extraocular movements intact [Normal Oropharynx] : the oropharynx was normal [Supple] : supple [Normal TMs] : both tympanic membranes were normal [No Lymphadenopathy] : no lymphadenopathy [No Respiratory Distress] : no respiratory distress  [No Accessory Muscle Use] : no accessory muscle use [Thyroid Normal, No Nodules] : the thyroid was normal and there were no nodules present [Clear to Auscultation] : lungs were clear to auscultation bilaterally [Normal Rate] : normal rate  [Regular Rhythm] : with a regular rhythm [No Edema] : there was no peripheral edema [Normal S1, S2] : normal S1 and S2 [Non-distended] : non-distended [Non Tender] : non-tender [Soft] : abdomen soft [Normal Anterior Cervical Nodes] : no anterior cervical lymphadenopathy [Normal Bowel Sounds] : normal bowel sounds [Grossly Normal Strength/Tone] : grossly normal strength/tone [No Spinal Tenderness] : no spinal tenderness [No Focal Deficits] : no focal deficits [No Rash] : no rash [Normal Affect] : the affect was normal [Normal Gait] : normal gait [Normal Insight/Judgement] : insight and judgment were intact

## 2019-12-03 NOTE — REVIEW OF SYSTEMS
[Fatigue] : fatigue [Negative] : Psychiatric [FreeTextEntry9] : numbness and pain in middle and ring finger on left hand

## 2019-12-03 NOTE — HISTORY OF PRESENT ILLNESS
[FreeTextEntry1] : cpe [de-identified] : Patient is here today for her physical. \par Overall doing well. \par Had IUD, mirena placed. \par She just had her D& C and is recovering well. She is following up on Friday with Dr. Pratt. HCG is trending down.\par  \par

## 2019-12-06 ENCOUNTER — APPOINTMENT (OUTPATIENT)
Dept: OBGYN | Facility: CLINIC | Age: 34
End: 2019-12-06
Payer: COMMERCIAL

## 2019-12-06 VITALS
HEIGHT: 65 IN | SYSTOLIC BLOOD PRESSURE: 137 MMHG | BODY MASS INDEX: 48.82 KG/M2 | DIASTOLIC BLOOD PRESSURE: 74 MMHG | WEIGHT: 293 LBS

## 2019-12-06 DIAGNOSIS — Z30.431 ENCOUNTER FOR ROUTINE CHECKING OF INTRAUTERINE CONTRACEPTIVE DEVICE: ICD-10-CM

## 2019-12-06 DIAGNOSIS — Z09 ENCOUNTER FOR FOLLOW-UP EXAMINATION AFTER COMPLETED TREATMENT FOR CONDITIONS OTHER THAN MALIGNANT NEOPLASM: ICD-10-CM

## 2019-12-06 PROCEDURE — 99024 POSTOP FOLLOW-UP VISIT: CPT

## 2019-12-06 PROCEDURE — 76830 TRANSVAGINAL US NON-OB: CPT

## 2019-12-09 LAB
25(OH)D3 SERPL-MCNC: 17.5 NG/ML
ALBUMIN SERPL ELPH-MCNC: 4 G/DL
ALP BLD-CCNC: 95 U/L
ALT SERPL-CCNC: 27 U/L
ANION GAP SERPL CALC-SCNC: 12 MMOL/L
APPEARANCE: CLEAR
AST SERPL-CCNC: 22 U/L
BACTERIA UR CULT: NORMAL
BASOPHILS # BLD AUTO: 0.06 K/UL
BASOPHILS NFR BLD AUTO: 0.7 %
BILIRUB SERPL-MCNC: 0.5 MG/DL
BILIRUBIN URINE: NEGATIVE
BLOOD URINE: NEGATIVE
BUN SERPL-MCNC: 10 MG/DL
CALCIUM SERPL-MCNC: 9.2 MG/DL
CHLORIDE SERPL-SCNC: 102 MMOL/L
CHOLEST SERPL-MCNC: 155 MG/DL
CHOLEST/HDLC SERPL: 3.3 RATIO
CO2 SERPL-SCNC: 26 MMOL/L
COLOR: NORMAL
CREAT SERPL-MCNC: 0.54 MG/DL
EOSINOPHIL # BLD AUTO: 0.23 K/UL
EOSINOPHIL NFR BLD AUTO: 2.8 %
ESTIMATED AVERAGE GLUCOSE: 169 MG/DL
FERRITIN SERPL-MCNC: 10 NG/ML
GLUCOSE QUALITATIVE U: NEGATIVE
GLUCOSE SERPL-MCNC: 133 MG/DL
HBA1C MFR BLD HPLC: 7.5 %
HCT VFR BLD CALC: 30.8 %
HDLC SERPL-MCNC: 47 MG/DL
HGB BLD-MCNC: 8.9 G/DL
HIV1+2 AB SPEC QL IA.RAPID: NONREACTIVE
IMM GRANULOCYTES NFR BLD AUTO: 0.6 %
IRON SATN MFR SERPL: 7 %
IRON SERPL-MCNC: 28 UG/DL
KETONES URINE: NEGATIVE
LDLC SERPL CALC-MCNC: 92 MG/DL
LEUKOCYTE ESTERASE URINE: NEGATIVE
LYMPHOCYTES # BLD AUTO: 1.84 K/UL
LYMPHOCYTES NFR BLD AUTO: 22.6 %
MAN DIFF?: NORMAL
MCHC RBC-ENTMCNC: 19.2 PG
MCHC RBC-ENTMCNC: 28.9 GM/DL
MCV RBC AUTO: 66.4 FL
MONOCYTES # BLD AUTO: 0.62 K/UL
MONOCYTES NFR BLD AUTO: 7.6 %
NEUTROPHILS # BLD AUTO: 5.35 K/UL
NEUTROPHILS NFR BLD AUTO: 65.7 %
NITRITE URINE: NEGATIVE
PH URINE: 7.5
PLATELET # BLD AUTO: 303 K/UL
POTASSIUM SERPL-SCNC: 4.2 MMOL/L
PROT SERPL-MCNC: 7.2 G/DL
PROTEIN URINE: NEGATIVE
RBC # BLD: 4.64 M/UL
RBC # FLD: 17.2 %
SODIUM SERPL-SCNC: 140 MMOL/L
SPECIFIC GRAVITY URINE: 1.02
T4 FREE SERPL-MCNC: 1.3 NG/DL
TIBC SERPL-MCNC: 384 UG/DL
TRIGL SERPL-MCNC: 81 MG/DL
TSH SERPL-ACNC: 0.77 UIU/ML
UIBC SERPL-MCNC: 356 UG/DL
UROBILINOGEN URINE: NORMAL
VIT B12 SERPL-MCNC: 423 PG/ML
WBC # FLD AUTO: 8.15 K/UL

## 2019-12-18 ENCOUNTER — RX RENEWAL (OUTPATIENT)
Age: 34
End: 2019-12-18

## 2019-12-23 ENCOUNTER — APPOINTMENT (OUTPATIENT)
Dept: FAMILY MEDICINE | Facility: CLINIC | Age: 34
End: 2019-12-23

## 2020-02-03 NOTE — ASU PATIENT PROFILE, ADULT - PRO ARRIVE FROM
home
Detail Level: Generalized
Products Recommended: EltaMD
General Sunscreen Counseling: I recommended a broad spectrum sunscreen with a SPF of 30 or higher.  I explained that SPF 30 sunscreens block approximately 97 percent of the sun's harmful rays.  Sunscreens should be applied at least 15 minutes prior to expected sun exposure and then every 2 hours after that as long as sun exposure continues. If swimming or exercising sunscreen should be reapplied every 45 minutes to an hour after getting wet or sweating.  One ounce, or the equivalent of a shot glass full of sunscreen, is adequate to protect the skin not covered by a bathing suit. I also recommended a lip balm with a sunscreen as well. Sun protective clothing can be used in lieu of sunscreen but must be worn the entire time you are exposed to the sun's rays.

## 2020-04-25 ENCOUNTER — MESSAGE (OUTPATIENT)
Age: 35
End: 2020-04-25

## 2020-05-02 LAB
SARS-COV-2 IGG SERPL IA-ACNC: 0.1 INDEX
SARS-COV-2 IGG SERPL QL IA: NEGATIVE

## 2020-05-20 ENCOUNTER — NON-APPOINTMENT (OUTPATIENT)
Age: 35
End: 2020-05-20

## 2020-05-20 ENCOUNTER — APPOINTMENT (OUTPATIENT)
Dept: INTERNAL MEDICINE | Facility: CLINIC | Age: 35
End: 2020-05-20
Payer: COMMERCIAL

## 2020-05-20 VITALS
BODY MASS INDEX: 48.82 KG/M2 | WEIGHT: 293 LBS | DIASTOLIC BLOOD PRESSURE: 80 MMHG | HEIGHT: 65 IN | TEMPERATURE: 98.2 F | SYSTOLIC BLOOD PRESSURE: 124 MMHG | OXYGEN SATURATION: 96 % | HEART RATE: 103 BPM

## 2020-05-20 DIAGNOSIS — E28.2 POLYCYSTIC OVARIAN SYNDROME: ICD-10-CM

## 2020-05-20 PROCEDURE — 36415 COLL VENOUS BLD VENIPUNCTURE: CPT

## 2020-05-20 PROCEDURE — 93000 ELECTROCARDIOGRAM COMPLETE: CPT | Mod: 59

## 2020-05-20 PROCEDURE — 99215 OFFICE O/P EST HI 40 MIN: CPT | Mod: 25

## 2020-05-20 RX ORDER — LOSARTAN POTASSIUM 100 MG/1
100 TABLET, FILM COATED ORAL DAILY
Qty: 90 | Refills: 0 | Status: DISCONTINUED | COMMUNITY
Start: 2019-12-18 | End: 2020-05-20

## 2020-05-20 RX ORDER — HYDROCHLOROTHIAZIDE 12.5 MG/1
12.5 TABLET ORAL
Qty: 90 | Refills: 0 | Status: DISCONTINUED | COMMUNITY
Start: 2019-12-18 | End: 2020-05-20

## 2020-05-20 RX ORDER — LABETALOL HYDROCHLORIDE 200 MG/1
200 TABLET, FILM COATED ORAL
Qty: 270 | Refills: 0 | Status: ACTIVE | COMMUNITY
Start: 2018-05-08 | End: 1900-01-01

## 2020-05-23 PROBLEM — E28.2 PCOS (POLYCYSTIC OVARIAN SYNDROME): Status: ACTIVE | Noted: 2018-11-12

## 2020-05-23 RX ORDER — LEVONORGESTREL 52 MG/1
20 INTRAUTERINE DEVICE INTRAUTERINE
Refills: 0 | Status: ACTIVE | COMMUNITY
Start: 2020-05-23

## 2020-05-23 NOTE — REVIEW OF SYSTEMS
[Recent Change In Weight] : ~T recent weight change [Negative] : Psychiatric [Chills] : no chills [Fever] : no fever [Fatigue] : no fatigue [Chest Pain] : no chest pain [Palpitations] : no palpitations [Lower Ext Edema] : no lower extremity edema [Shortness Of Breath] : no shortness of breath [Wheezing] : no wheezing [Dyspnea on Exertion] : no dyspnea on exertion [Cough] : no cough [Nausea] : no nausea [Abdominal Pain] : no abdominal pain [Diarrhea] : diarrhea [Vomiting] : no vomiting [FreeTextEntry2] : 27 lb weight gain

## 2020-05-23 NOTE — PHYSICAL EXAM
[No Acute Distress] : no acute distress [Normal Voice/Communication] : normal voice/communication [Well-Appearing] : well-appearing [Normal Oropharynx] : the oropharynx was normal [Normal Sclera/Conjunctiva] : normal sclera/conjunctiva [PERRL] : pupils equal round and reactive to light [No Lymphadenopathy] : no lymphadenopathy [No JVD] : no jugular venous distention [Supple] : supple [Thyroid Normal, No Nodules] : the thyroid was normal and there were no nodules present [No Respiratory Distress] : no respiratory distress  [No Accessory Muscle Use] : no accessory muscle use [Clear to Auscultation] : lungs were clear to auscultation bilaterally [Normal Rate] : normal rate  [Normal S1, S2] : normal S1 and S2 [Regular Rhythm] : with a regular rhythm [No Edema] : there was no peripheral edema [No Murmur] : no murmur heard [Non Tender] : non-tender [Soft] : abdomen soft [Non-distended] : non-distended [No Masses] : no abdominal mass palpated [No HSM] : no HSM [No Spinal Tenderness] : no spinal tenderness [Normal Bowel Sounds] : normal bowel sounds [No Rash] : no rash [No Skin Lesions] : no skin lesions [No Focal Deficits] : no focal deficits [Normal Affect] : the affect was normal [Normal Mood] : the mood was normal [Alert and Oriented x3] : oriented to person, place, and time [Normal Insight/Judgement] : insight and judgment were intact [de-identified] : Obese [de-identified] : no calf tenderness

## 2020-05-23 NOTE — ASSESSMENT
[FreeTextEntry1] : \par Diabetes Type 2:\par -last HgA1c was 7.5 2019\par -she is on metformin\par -Will check HgA1c, lipids, and A:C\par -ophthalmology-advised she will make appt\par \par Morbid obesity.\par -BMI 62\par -she has gained 27 lbs since last visit\par -risks of obesity discussed\par -benefits of weight loss discussed\par -low fat/low chol diet and low carbohydrate diet advised\par -small portion sizes encouraged\par -I advised avoidance of sugary drinks\par -weight loss advised\par -she does admit to snoring bt denies daytime fatigue-I discussed with her that she would benefit from sleep study to r/o TAYLA-she was agreeable and will refer to pulmonary\par \par HTN:\par -BP at goal on losartan/HCTZ and labetolol\par -fasting labs ordered\par \par PCOS:\par -on metformin\par \par Anemia:\par -will check labs\par \par Vitamin D deficiency:\par -will check vitamin D 25-OH\par \par HCM\par \par Depression screenin2019 negative\par \par EKG  NSR at 92, poor R wave progression, no ST abnormalities-she denies chest pain or SOB-given abnormal EKG I have advised she see cardiology prior to starting exercise program\par \par Flu shot: 10/2019\par \par Tdap: advised-she states she will check her records\par \par Pneumovax: advised-she states she will check her records\par \par HIV testin2019 negative\par \par GYN/PAP: 2019 negative\par \par Covid antibody test: she states she had done 2020 and it was negative\par \par \par F/U 3 months.  Fasting labs ordered.  Form for DMV completed today\par \par

## 2020-05-23 NOTE — COUNSELING
[Potential consequences of obesity discussed] : Potential consequences of obesity discussed [Good understanding] : Patient has a good understanding of disease, goals and obesity follow-up plan [Benefits of weight loss discussed] : Benefits of weight loss discussed

## 2020-08-04 ENCOUNTER — EMERGENCY (EMERGENCY)
Facility: HOSPITAL | Age: 35
LOS: 0 days | Discharge: HOME | End: 2020-08-04
Admitting: EMERGENCY MEDICINE
Payer: COMMERCIAL

## 2020-08-04 VITALS
TEMPERATURE: 98 F | RESPIRATION RATE: 19 BRPM | HEART RATE: 73 BPM | SYSTOLIC BLOOD PRESSURE: 118 MMHG | DIASTOLIC BLOOD PRESSURE: 48 MMHG | OXYGEN SATURATION: 100 %

## 2020-08-04 DIAGNOSIS — Z79.84 LONG TERM (CURRENT) USE OF ORAL HYPOGLYCEMIC DRUGS: ICD-10-CM

## 2020-08-04 DIAGNOSIS — E66.01 MORBID (SEVERE) OBESITY DUE TO EXCESS CALORIES: ICD-10-CM

## 2020-08-04 DIAGNOSIS — I10 ESSENTIAL (PRIMARY) HYPERTENSION: ICD-10-CM

## 2020-08-04 DIAGNOSIS — Z20.828 CONTACT WITH AND (SUSPECTED) EXPOSURE TO OTHER VIRAL COMMUNICABLE DISEASES: ICD-10-CM

## 2020-08-04 DIAGNOSIS — Z98.891 HISTORY OF UTERINE SCAR FROM PREVIOUS SURGERY: Chronic | ICD-10-CM

## 2020-08-04 DIAGNOSIS — D50.9 IRON DEFICIENCY ANEMIA, UNSPECIFIED: ICD-10-CM

## 2020-08-04 DIAGNOSIS — E11.9 TYPE 2 DIABETES MELLITUS WITHOUT COMPLICATIONS: ICD-10-CM

## 2020-08-04 PROCEDURE — 99283 EMERGENCY DEPT VISIT LOW MDM: CPT

## 2020-08-04 PROCEDURE — U0003: CPT

## 2020-08-04 NOTE — ED STATDOCS - PROGRESS NOTE DETAILS
Lul slightly elevated. Pt did niot take her mediction PTA. Spoke with Dr. Whipple. Pt can go home and take her medication.

## 2020-08-04 NOTE — ED STATDOCS - OBJECTIVE STATEMENT
33 yo female presents for covid testing. Pt went down to [place] and returned sunday and needs testing for work.

## 2020-08-04 NOTE — ED STATDOCS - PATIENT PORTAL LINK FT
You can access the FollowMyHealth Patient Portal offered by [facility] by registering at the following website: [email or web address]. By joining [facility]’s FollowMyHealth portal, you will also be able to view your health information using other applications (apps) compatible with our system.

## 2020-08-05 LAB — SARS-COV-2 RNA SPEC QL NAA+PROBE: SIGNIFICANT CHANGE UP

## 2020-08-20 ENCOUNTER — APPOINTMENT (OUTPATIENT)
Dept: INTERNAL MEDICINE | Facility: CLINIC | Age: 35
End: 2020-08-20
Payer: COMMERCIAL

## 2020-08-20 VITALS
HEIGHT: 65 IN | OXYGEN SATURATION: 96 % | WEIGHT: 293 LBS | HEART RATE: 94 BPM | SYSTOLIC BLOOD PRESSURE: 126 MMHG | BODY MASS INDEX: 48.82 KG/M2 | RESPIRATION RATE: 16 BRPM | DIASTOLIC BLOOD PRESSURE: 78 MMHG | TEMPERATURE: 98.2 F

## 2020-08-20 DIAGNOSIS — R06.83 SNORING: ICD-10-CM

## 2020-08-20 DIAGNOSIS — Z13.31 ENCOUNTER FOR SCREENING FOR DEPRESSION: ICD-10-CM

## 2020-08-20 DIAGNOSIS — R94.31 ABNORMAL ELECTROCARDIOGRAM [ECG] [EKG]: ICD-10-CM

## 2020-08-20 DIAGNOSIS — I10 ESSENTIAL (PRIMARY) HYPERTENSION: ICD-10-CM

## 2020-08-20 DIAGNOSIS — N92.6 IRREGULAR MENSTRUATION, UNSPECIFIED: ICD-10-CM

## 2020-08-20 DIAGNOSIS — M79.672 PAIN IN RIGHT FOOT: ICD-10-CM

## 2020-08-20 DIAGNOSIS — M79.671 PAIN IN RIGHT FOOT: ICD-10-CM

## 2020-08-20 DIAGNOSIS — Z23 ENCOUNTER FOR IMMUNIZATION: ICD-10-CM

## 2020-08-20 LAB
HCG UR QL: NEGATIVE
QUALITY CONTROL: YES

## 2020-08-20 PROCEDURE — 36415 COLL VENOUS BLD VENIPUNCTURE: CPT

## 2020-08-20 PROCEDURE — 90732 PPSV23 VACC 2 YRS+ SUBQ/IM: CPT

## 2020-08-20 PROCEDURE — 99214 OFFICE O/P EST MOD 30 MIN: CPT | Mod: 25

## 2020-08-20 PROCEDURE — 81025 URINE PREGNANCY TEST: CPT

## 2020-08-20 PROCEDURE — G0009: CPT

## 2020-08-20 PROCEDURE — 96127 BRIEF EMOTIONAL/BEHAV ASSMT: CPT

## 2020-08-20 NOTE — HISTORY OF PRESENT ILLNESS
[de-identified] : Here for follow up\par \par She did not get a chance to do labs yet\par \par She states she has decided to meet Dr Valles to discuss weight loss surgery\par \par She does not check glucose FS regularly\par \par LMP early July 2020\par \par She reports she gets intermittent pain in her heels from time to time.  She thinks her weight is contributing.  No trauma reported

## 2020-08-20 NOTE — REVIEW OF SYSTEMS
[Negative] : Neurological [Fever] : no fever [Chills] : no chills [Fatigue] : no fatigue [Chest Pain] : no chest pain [Palpitations] : no palpitations [Lower Ext Edema] : no lower extremity edema [Shortness Of Breath] : no shortness of breath [Wheezing] : no wheezing [Cough] : no cough [Dyspnea on Exertion] : no dyspnea on exertion [Abdominal Pain] : no abdominal pain [Nausea] : no nausea [Vomiting] : no vomiting [Diarrhea] : diarrhea [FreeTextEntry9] : see HPI

## 2020-08-20 NOTE — ASSESSMENT
[FreeTextEntry1] : \par Heel pain:\par -will refer to podiatry\par \par Diabetes Type 2:\par -last HgA1c was 7.5 2019-will check fasting labs\par -she is on metformin\par -ophthalmology-advised and referred today\par -Foot exam: 2020\par -will refer to Endocrine today\par \par Morbid obesity.\par -risks of obesity discussed\par -benefits of weight loss discussed\par -low fat/low chol diet and low carbohydrate diet advised\par -small portion sizes encouraged\par -I advised avoidance of sugary drinks\par -weight loss advised\par -she states she will be seeing Bariatric surgery for consultation\par \par Snoring/Obesity\par -I discussed with her that she would benefit from sleep study to r/o TAYLA-will refer again\par \par HTN:\par -BP at goal on losartan/HCTZ and labetolol\par -fasting labs ordered\par \par PCOS:\par -on metformin\par \par Anemia:\par -will check labs\par \par Vitamin D deficiency:\par -will check vitamin D 25-OH\par \par Irregular menses:\par -urine preg negative\par -she has been advised to see her GYN\par \par HCM\par \par Depression screenin2020 negative\par \par EKG  NSR at 92, poor R wave progression, no ST abnormalities-she denies chest pain or SOB-She was referred to cardiology previously\par \par Flu shot: 10/2019-advised in the fall\par \par Tdap: she believes she has in 2018\par \par Pneumovax: advised-R/B discussed.  VIS given  Pneumovax given today 2020\par \par HIV testin2019 negative\par \par GYN/PAP: 2019 negative-advised f/u with her GYN\par \par Covid antibody test:  2020 negative per pt\par \par \par F/U 3 months.  Fasting labs drawn today \par \par

## 2020-08-20 NOTE — PHYSICAL EXAM
[Normal Voice/Communication] : normal voice/communication [Well-Appearing] : well-appearing [No Acute Distress] : no acute distress [PERRL] : pupils equal round and reactive to light [Normal Sclera/Conjunctiva] : normal sclera/conjunctiva [Normal Oropharynx] : the oropharynx was normal [No JVD] : no jugular venous distention [No Lymphadenopathy] : no lymphadenopathy [Supple] : supple [No Respiratory Distress] : no respiratory distress  [Thyroid Normal, No Nodules] : the thyroid was normal and there were no nodules present [Clear to Auscultation] : lungs were clear to auscultation bilaterally [No Accessory Muscle Use] : no accessory muscle use [Normal S1, S2] : normal S1 and S2 [Regular Rhythm] : with a regular rhythm [Normal Rate] : normal rate  [No Edema] : there was no peripheral edema [No Murmur] : no murmur heard [No Rash] : no rash [No Focal Deficits] : no focal deficits [Alert and Oriented x3] : oriented to person, place, and time [Normal Affect] : the affect was normal [Normal Mood] : the mood was normal [Normal Insight/Judgement] : insight and judgment were intact [No Joint Swelling] : no joint swelling [Comprehensive Foot Exam Normal] : Right and left foot were examined and both feet are normal. No ulcers in either foot. Toes are normal and with full ROM.  Normal tactile sensation with monofilament testing throughout both feet [de-identified] : no calf tenderness [de-identified] : Obese [de-identified] : + 2 DP/PT pulses B/L

## 2020-08-23 LAB
25(OH)D3 SERPL-MCNC: 25.9 NG/ML
ALBUMIN SERPL ELPH-MCNC: 4.1 G/DL
ALP BLD-CCNC: 107 U/L
ALT SERPL-CCNC: 45 U/L
ANION GAP SERPL CALC-SCNC: 14 MMOL/L
APPEARANCE: ABNORMAL
AST SERPL-CCNC: 41 U/L
BACTERIA: ABNORMAL
BASOPHILS # BLD AUTO: 0.07 K/UL
BASOPHILS NFR BLD AUTO: 0.7 %
BILIRUB SERPL-MCNC: 0.5 MG/DL
BILIRUBIN URINE: NEGATIVE
BLOOD URINE: NEGATIVE
BUN SERPL-MCNC: 12 MG/DL
CALCIUM SERPL-MCNC: 9.2 MG/DL
CHLORIDE SERPL-SCNC: 97 MMOL/L
CHOLEST SERPL-MCNC: 151 MG/DL
CHOLEST/HDLC SERPL: 3.6 RATIO
CO2 SERPL-SCNC: 24 MMOL/L
COLOR: YELLOW
CREAT SERPL-MCNC: 0.55 MG/DL
CREAT SPEC-SCNC: 146 MG/DL
EOSINOPHIL # BLD AUTO: 0.16 K/UL
EOSINOPHIL NFR BLD AUTO: 1.6 %
ERYTHROCYTE [SEDIMENTATION RATE] IN BLOOD BY WESTERGREN METHOD: 48 MM/HR
ESTIMATED AVERAGE GLUCOSE: 289 MG/DL
FERRITIN SERPL-MCNC: 21 NG/ML
FOLATE SERPL-MCNC: 9.2 NG/ML
GLUCOSE QUALITATIVE U: ABNORMAL
GLUCOSE SERPL-MCNC: 315 MG/DL
HBA1C MFR BLD HPLC: 11.7 %
HCT VFR BLD CALC: 38.6 %
HDLC SERPL-MCNC: 42 MG/DL
HGB BLD-MCNC: 10.8 G/DL
HYALINE CASTS: 0 /LPF
IMM GRANULOCYTES NFR BLD AUTO: 0.4 %
IRON SATN MFR SERPL: 10 %
IRON SERPL-MCNC: 35 UG/DL
KETONES URINE: NEGATIVE
LDLC SERPL CALC-MCNC: 89 MG/DL
LEUKOCYTE ESTERASE URINE: ABNORMAL
LYMPHOCYTES # BLD AUTO: 2.12 K/UL
LYMPHOCYTES NFR BLD AUTO: 21.2 %
MAN DIFF?: NORMAL
MCHC RBC-ENTMCNC: 20.6 PG
MCHC RBC-ENTMCNC: 28 GM/DL
MCV RBC AUTO: 73.5 FL
MICROALBUMIN 24H UR DL<=1MG/L-MCNC: 5.8 MG/DL
MICROALBUMIN/CREAT 24H UR-RTO: 40 MG/G
MICROSCOPIC-UA: NORMAL
MONOCYTES # BLD AUTO: 0.63 K/UL
MONOCYTES NFR BLD AUTO: 6.3 %
NEUTROPHILS # BLD AUTO: 6.99 K/UL
NEUTROPHILS NFR BLD AUTO: 69.8 %
NITRITE URINE: NEGATIVE
PH URINE: 5.5
PLATELET # BLD AUTO: 278 K/UL
POTASSIUM SERPL-SCNC: 4.3 MMOL/L
PROT SERPL-MCNC: 7.3 G/DL
PROTEIN URINE: ABNORMAL
RBC # BLD: 5.25 M/UL
RBC # FLD: 18.8 %
RED BLOOD CELLS URINE: 2 /HPF
SODIUM SERPL-SCNC: 135 MMOL/L
SPECIFIC GRAVITY URINE: 1.03
SQUAMOUS EPITHELIAL CELLS: 10 /HPF
T4 FREE SERPL-MCNC: 1.3 NG/DL
TIBC SERPL-MCNC: 364 UG/DL
TRIGL SERPL-MCNC: 98 MG/DL
TSH SERPL-ACNC: 1.23 UIU/ML
UIBC SERPL-MCNC: 329 UG/DL
UROBILINOGEN URINE: NORMAL
VIT B12 SERPL-MCNC: 540 PG/ML
WBC # FLD AUTO: 10.01 K/UL
WHITE BLOOD CELLS URINE: 12 /HPF

## 2020-09-10 ENCOUNTER — APPOINTMENT (OUTPATIENT)
Dept: ENDOCRINOLOGY | Facility: CLINIC | Age: 35
End: 2020-09-10
Payer: COMMERCIAL

## 2020-09-10 VITALS
SYSTOLIC BLOOD PRESSURE: 128 MMHG | OXYGEN SATURATION: 96 % | BODY MASS INDEX: 48.82 KG/M2 | WEIGHT: 293 LBS | HEART RATE: 114 BPM | HEIGHT: 65 IN | DIASTOLIC BLOOD PRESSURE: 76 MMHG

## 2020-09-10 DIAGNOSIS — E66.01 MORBID (SEVERE) OBESITY DUE TO EXCESS CALORIES: ICD-10-CM

## 2020-09-10 DIAGNOSIS — E78.5 HYPERLIPIDEMIA, UNSPECIFIED: ICD-10-CM

## 2020-09-10 DIAGNOSIS — E11.9 TYPE 2 DIABETES MELLITUS W/OUT COMPLICATIONS: ICD-10-CM

## 2020-09-10 DIAGNOSIS — E55.9 VITAMIN D DEFICIENCY, UNSPECIFIED: ICD-10-CM

## 2020-09-10 LAB — GLUCOSE BLDC GLUCOMTR-MCNC: 343

## 2020-09-10 PROCEDURE — 82962 GLUCOSE BLOOD TEST: CPT

## 2020-09-10 PROCEDURE — 99205 OFFICE O/P NEW HI 60 MIN: CPT | Mod: 25

## 2020-09-10 RX ORDER — SEMAGLUTIDE 1.34 MG/ML
2 INJECTION, SOLUTION SUBCUTANEOUS
Qty: 3 | Refills: 1 | Status: ACTIVE | COMMUNITY
Start: 2020-09-10 | End: 1900-01-01

## 2020-09-10 RX ORDER — INSULIN GLARGINE 100 [IU]/ML
100 INJECTION, SOLUTION SUBCUTANEOUS
Qty: 15 | Refills: 0 | Status: ACTIVE | COMMUNITY
Start: 2020-09-10 | End: 1900-01-01

## 2020-09-24 RX ORDER — PEN NEEDLE, DIABETIC 30 GX3/16"
30G X 8 MM NEEDLE, DISPOSABLE MISCELLANEOUS
Qty: 100 | Refills: 3 | Status: ACTIVE | COMMUNITY
Start: 2020-09-24 | End: 1900-01-01

## 2020-10-01 ENCOUNTER — APPOINTMENT (OUTPATIENT)
Dept: ENDOCRINOLOGY | Facility: CLINIC | Age: 35
End: 2020-10-01

## 2020-11-05 NOTE — ED ADULT NURSE NOTE - OBJECTIVE STATEMENT
b/l mastectomy
Patient was at work and slipped on water that was on the floor, landed on left knee and fell onto her body. Able to ambulate

## 2020-11-16 ENCOUNTER — EMERGENCY (EMERGENCY)
Facility: HOSPITAL | Age: 35
LOS: 0 days | Discharge: HOME | End: 2020-11-16
Attending: EMERGENCY MEDICINE
Payer: COMMERCIAL

## 2020-11-16 ENCOUNTER — TRANSCRIPTION ENCOUNTER (OUTPATIENT)
Age: 35
End: 2020-11-16

## 2020-11-16 VITALS
DIASTOLIC BLOOD PRESSURE: 99 MMHG | TEMPERATURE: 98 F | HEART RATE: 91 BPM | SYSTOLIC BLOOD PRESSURE: 163 MMHG | OXYGEN SATURATION: 99 % | RESPIRATION RATE: 16 BRPM

## 2020-11-16 VITALS — HEIGHT: 65 IN | WEIGHT: 293 LBS

## 2020-11-16 DIAGNOSIS — I10 ESSENTIAL (PRIMARY) HYPERTENSION: ICD-10-CM

## 2020-11-16 DIAGNOSIS — Z98.891 HISTORY OF UTERINE SCAR FROM PREVIOUS SURGERY: Chronic | ICD-10-CM

## 2020-11-16 DIAGNOSIS — D50.9 IRON DEFICIENCY ANEMIA, UNSPECIFIED: ICD-10-CM

## 2020-11-16 DIAGNOSIS — L02.511 CUTANEOUS ABSCESS OF RIGHT HAND: ICD-10-CM

## 2020-11-16 DIAGNOSIS — E66.01 MORBID (SEVERE) OBESITY DUE TO EXCESS CALORIES: ICD-10-CM

## 2020-11-16 DIAGNOSIS — Z79.84 LONG TERM (CURRENT) USE OF ORAL HYPOGLYCEMIC DRUGS: ICD-10-CM

## 2020-11-16 DIAGNOSIS — E11.9 TYPE 2 DIABETES MELLITUS WITHOUT COMPLICATIONS: ICD-10-CM

## 2020-11-16 LAB
ADD ON TEST-SPECIMEN IN LAB: SIGNIFICANT CHANGE UP
ALBUMIN SERPL ELPH-MCNC: 3.2 G/DL — LOW (ref 3.3–5)
ALP SERPL-CCNC: 120 U/L — SIGNIFICANT CHANGE UP (ref 40–120)
ALT FLD-CCNC: 42 U/L — SIGNIFICANT CHANGE UP (ref 12–78)
ANION GAP SERPL CALC-SCNC: 6 MMOL/L — SIGNIFICANT CHANGE UP (ref 5–17)
AST SERPL-CCNC: 33 U/L — SIGNIFICANT CHANGE UP (ref 15–37)
BASOPHILS # BLD AUTO: 0.06 K/UL — SIGNIFICANT CHANGE UP (ref 0–0.2)
BASOPHILS NFR BLD AUTO: 0.5 % — SIGNIFICANT CHANGE UP (ref 0–2)
BILIRUB SERPL-MCNC: 0.3 MG/DL — SIGNIFICANT CHANGE UP (ref 0.2–1.2)
BUN SERPL-MCNC: 11 MG/DL — SIGNIFICANT CHANGE UP (ref 7–23)
CALCIUM SERPL-MCNC: 9.2 MG/DL — SIGNIFICANT CHANGE UP (ref 8.5–10.1)
CHLORIDE SERPL-SCNC: 104 MMOL/L — SIGNIFICANT CHANGE UP (ref 96–108)
CO2 SERPL-SCNC: 27 MMOL/L — SIGNIFICANT CHANGE UP (ref 22–31)
CREAT SERPL-MCNC: 0.66 MG/DL — SIGNIFICANT CHANGE UP (ref 0.5–1.3)
EOSINOPHIL # BLD AUTO: 0.19 K/UL — SIGNIFICANT CHANGE UP (ref 0–0.5)
EOSINOPHIL NFR BLD AUTO: 1.6 % — SIGNIFICANT CHANGE UP (ref 0–6)
GLUCOSE SERPL-MCNC: 247 MG/DL — HIGH (ref 70–99)
HCT VFR BLD CALC: 37.2 % — SIGNIFICANT CHANGE UP (ref 34.5–45)
HGB BLD-MCNC: 11.5 G/DL — SIGNIFICANT CHANGE UP (ref 11.5–15.5)
IMM GRANULOCYTES NFR BLD AUTO: 0.3 % — SIGNIFICANT CHANGE UP (ref 0–1.5)
LACTATE SERPL-SCNC: 1.6 MMOL/L — SIGNIFICANT CHANGE UP (ref 0.7–2)
LYMPHOCYTES # BLD AUTO: 2.92 K/UL — SIGNIFICANT CHANGE UP (ref 1–3.3)
LYMPHOCYTES # BLD AUTO: 24.5 % — SIGNIFICANT CHANGE UP (ref 13–44)
MCHC RBC-ENTMCNC: 22.4 PG — LOW (ref 27–34)
MCHC RBC-ENTMCNC: 30.9 GM/DL — LOW (ref 32–36)
MCV RBC AUTO: 72.5 FL — LOW (ref 80–100)
MONOCYTES # BLD AUTO: 0.74 K/UL — SIGNIFICANT CHANGE UP (ref 0–0.9)
MONOCYTES NFR BLD AUTO: 6.2 % — SIGNIFICANT CHANGE UP (ref 2–14)
NEUTROPHILS # BLD AUTO: 7.99 K/UL — HIGH (ref 1.8–7.4)
NEUTROPHILS NFR BLD AUTO: 66.9 % — SIGNIFICANT CHANGE UP (ref 43–77)
PLATELET # BLD AUTO: 255 K/UL — SIGNIFICANT CHANGE UP (ref 150–400)
POTASSIUM SERPL-MCNC: 3.7 MMOL/L — SIGNIFICANT CHANGE UP (ref 3.5–5.3)
POTASSIUM SERPL-SCNC: 3.7 MMOL/L — SIGNIFICANT CHANGE UP (ref 3.5–5.3)
PROT SERPL-MCNC: 8.1 GM/DL — SIGNIFICANT CHANGE UP (ref 6–8.3)
RBC # BLD: 5.13 M/UL — SIGNIFICANT CHANGE UP (ref 3.8–5.2)
RBC # FLD: 15.1 % — HIGH (ref 10.3–14.5)
SODIUM SERPL-SCNC: 137 MMOL/L — SIGNIFICANT CHANGE UP (ref 135–145)
WBC # BLD: 11.93 K/UL — HIGH (ref 3.8–10.5)
WBC # FLD AUTO: 11.93 K/UL — HIGH (ref 3.8–10.5)

## 2020-11-16 PROCEDURE — 96375 TX/PRO/DX INJ NEW DRUG ADDON: CPT

## 2020-11-16 PROCEDURE — 85025 COMPLETE CBC W/AUTO DIFF WBC: CPT

## 2020-11-16 PROCEDURE — 86140 C-REACTIVE PROTEIN: CPT

## 2020-11-16 PROCEDURE — 73130 X-RAY EXAM OF HAND: CPT | Mod: 26,RT

## 2020-11-16 PROCEDURE — 99284 EMERGENCY DEPT VISIT MOD MDM: CPT | Mod: 25

## 2020-11-16 PROCEDURE — 83605 ASSAY OF LACTIC ACID: CPT

## 2020-11-16 PROCEDURE — 73130 X-RAY EXAM OF HAND: CPT | Mod: RT

## 2020-11-16 PROCEDURE — 87040 BLOOD CULTURE FOR BACTERIA: CPT

## 2020-11-16 PROCEDURE — 10060 I&D ABSCESS SIMPLE/SINGLE: CPT

## 2020-11-16 PROCEDURE — 80053 COMPREHEN METABOLIC PANEL: CPT

## 2020-11-16 PROCEDURE — 99284 EMERGENCY DEPT VISIT MOD MDM: CPT

## 2020-11-16 PROCEDURE — 36415 COLL VENOUS BLD VENIPUNCTURE: CPT

## 2020-11-16 PROCEDURE — 85652 RBC SED RATE AUTOMATED: CPT

## 2020-11-16 PROCEDURE — 87070 CULTURE OTHR SPECIMN AEROBIC: CPT

## 2020-11-16 PROCEDURE — 87186 SC STD MICRODIL/AGAR DIL: CPT

## 2020-11-16 PROCEDURE — 96374 THER/PROPH/DIAG INJ IV PUSH: CPT

## 2020-11-16 RX ORDER — LABETALOL HCL 100 MG
200 TABLET ORAL ONCE
Refills: 0 | Status: COMPLETED | OUTPATIENT
Start: 2020-11-16 | End: 2020-11-16

## 2020-11-16 RX ORDER — SODIUM CHLORIDE 9 MG/ML
1000 INJECTION INTRAMUSCULAR; INTRAVENOUS; SUBCUTANEOUS ONCE
Refills: 0 | Status: COMPLETED | OUTPATIENT
Start: 2020-11-16 | End: 2020-11-16

## 2020-11-16 RX ORDER — OXYCODONE AND ACETAMINOPHEN 5; 325 MG/1; MG/1
1 TABLET ORAL ONCE
Refills: 0 | Status: DISCONTINUED | OUTPATIENT
Start: 2020-11-16 | End: 2020-11-16

## 2020-11-16 RX ORDER — CEPHALEXIN 500 MG
1 CAPSULE ORAL
Qty: 40 | Refills: 0
Start: 2020-11-16 | End: 2020-11-25

## 2020-11-16 RX ORDER — AZTREONAM 2 G
160 VIAL (EA) INJECTION
Qty: 3200 | Refills: 0
Start: 2020-11-16 | End: 2020-11-25

## 2020-11-16 RX ORDER — MORPHINE SULFATE 50 MG/1
4 CAPSULE, EXTENDED RELEASE ORAL ONCE
Refills: 0 | Status: DISCONTINUED | OUTPATIENT
Start: 2020-11-16 | End: 2020-11-16

## 2020-11-16 RX ORDER — CEFAZOLIN SODIUM 1 G
2000 VIAL (EA) INJECTION ONCE
Refills: 0 | Status: COMPLETED | OUTPATIENT
Start: 2020-11-16 | End: 2020-11-16

## 2020-11-16 RX ORDER — ONDANSETRON 8 MG/1
4 TABLET, FILM COATED ORAL ONCE
Refills: 0 | Status: COMPLETED | OUTPATIENT
Start: 2020-11-16 | End: 2020-11-16

## 2020-11-16 RX ORDER — LOSARTAN POTASSIUM 100 MG/1
100 TABLET, FILM COATED ORAL ONCE
Refills: 0 | Status: COMPLETED | OUTPATIENT
Start: 2020-11-16 | End: 2020-11-16

## 2020-11-16 RX ADMIN — MORPHINE SULFATE 4 MILLIGRAM(S): 50 CAPSULE, EXTENDED RELEASE ORAL at 21:35

## 2020-11-16 RX ADMIN — SODIUM CHLORIDE 2000 MILLILITER(S): 9 INJECTION INTRAMUSCULAR; INTRAVENOUS; SUBCUTANEOUS at 20:51

## 2020-11-16 RX ADMIN — Medication 200 MILLIGRAM(S): at 21:26

## 2020-11-16 RX ADMIN — Medication 1 TABLET(S): at 20:52

## 2020-11-16 RX ADMIN — Medication 100 MILLIGRAM(S): at 20:51

## 2020-11-16 RX ADMIN — ONDANSETRON 4 MILLIGRAM(S): 8 TABLET, FILM COATED ORAL at 21:31

## 2020-11-16 RX ADMIN — LOSARTAN POTASSIUM 100 MILLIGRAM(S): 100 TABLET, FILM COATED ORAL at 20:51

## 2020-11-16 NOTE — ED STATDOCS - PATIENT PORTAL LINK FT
You can access the FollowMyHealth Patient Portal offered by [facility] by registering at the following website: [email or web address]. By joining [facility]’s FollowMyHealth portal, you will also be able to view your health information using other applications (apps) compatible with our system.

## 2020-11-16 NOTE — CONSULT NOTE ADULT - SUBJECTIVE AND OBJECTIVE BOX
35y Female RHD with c/o R dorsal hand swelling/pain for the past 4 days. Denies traumatic injury, bites, or any other rashes. Denies HS/LOC. Denies numbness/tingling. No other pain/injuries. Denies fevers/chills. Patient up to date on tetanus.       HEALTH ISSUES - PROBLEM Dx:        MEDICATIONS  (STANDING):  labetalol 200 milliGRAM(s) Oral Once  morphine  - Injectable 4 milliGRAM(s) IV Push Once  ondansetron Injectable 4 milliGRAM(s) IV Push once    Allergies    No Known Allergies    Intolerances        Physical Exam  Gen: NAD  RUE: Warm/well perfused, with swelling over dorsal hand with focal point of purulence coming to head. Patient sensation intact to light touch, with grossly  intact flexion/extension at PIP/DIP joints. FDS/FDP grossly intact       Procedure:   R hand was prepped and draped in sterile fashion. 1% lidocaine was used for regional anesthesia of the injured area. A small incision was made over the dorsal aspect of the hand and approximately 2 in of packing placed into wound. Cultures were taken from wound. The incision was dressed with dry dressings. The patient tolerated the procedure well. NVI post-procedure.     Imaging  XR R Hand: No acute fracture or dislocation                          11.5   11.93 )-----------( 255      ( 16 Nov 2020 20:24 )             37.2     16 Nov 2020 20:24    137    |  104    |  11     ----------------------------<  247    3.7     |  27     |  0.66     Ca    9.2        16 Nov 2020 20:24    TPro  8.1    /  Alb  3.2    /  TBili  0.3    /  DBili  x      /  AST  33     /  ALT  42     /  AlkPhos  120    16 Nov 2020 20:24    Vital Signs Last 24 Hrs  T(C): 36.8 (11-16-20 @ 19:27), Max: 36.8 (11-16-20 @ 19:27)  T(F): 98.2 (11-16-20 @ 19:27), Max: 98.2 (11-16-20 @ 19:27)  HR: 99 (11-16-20 @ 19:27) (99 - 99)  BP: 193/128 (11-16-20 @ 19:27) (193/128 - 193/128)  BP(mean): 148 (11-16-20 @ 19:27) (148 - 148)  RR: 17 (11-16-20 @ 19:27) (17 - 17)  SpO2: 100% (11-16-20 @ 19:27) (100% - 100%)    A/P: 35y Female with R superficial dorsal hand abscess  Pain control  Ice/elevate as needed  Keep dressing clean and dry  Abscess I&D'd at bedside, ok for patient to be sent with oral antibiotics with close outpatient follow-up  F/u wound cultures  F/u with Dr. Kendrick in office  All question answered  Follow up with Dr. Kendrick as outpatient within 2-3 days, call office for appointment   Ortho stable for discharge

## 2020-11-16 NOTE — ED STATDOCS - CLINICAL SUMMARY MEDICAL DECISION MAKING FREE TEXT BOX
36 yo pt with prob cellulitis back of right hand.  Plan check labs, iv abx and hand consult. 34 yo pt with prob cellulitis back of right hand.  Plan check labs, iv abx and hand consult.    I&D performed by orthopedic resident, Dr. Hillman who sent wound Cx.   To be DC with pain management and Keflex/Bactrim.  Informed patient that she must take her HTN medications daily.  Brandy Joshua PA-C

## 2020-11-16 NOTE — ED STATDOCS - ATTENDING CONTRIBUTION TO CARE
I, Violetta Lozada MD,  performed the initial face to face bedside interview with this patient regarding history of present illness, review of symptoms and relevant past medical, social and family history.  I completed an independent physical examination.  I was the initial provider who evaluated this patient. I have signed out the follow up of any pending tests (i.e. labs, radiological studies) to the ACP.  I have communicated the patient’s plan of care and disposition with the ACP.

## 2020-11-16 NOTE — ED ADULT TRIAGE NOTE - CHIEF COMPLAINT QUOTE
pt c/o R. top of hand redness and swelling since Thursday or Friday w/ worsening pain. denies fevers and n/v

## 2020-11-16 NOTE — ED STATDOCS - CARE PLAN
Principal Discharge DX:	Abscess of hand  Secondary Diagnosis:	Elevated blood pressure reading  Secondary Diagnosis:	Hyperglycemia

## 2020-11-16 NOTE — ED STATDOCS - SKIN, MLM
dorsum right hand with 3 cm area of bright red and firm with dark center, pt with some lighter redness to res of dorsum hand.  No streaking redness up ar,

## 2020-11-16 NOTE — ED STATDOCS - NSFOLLOWUPINSTRUCTIONS_ED_ALL_ED_FT
Skin Abscess  ImageA skin abscess is an infected area on or under your skin that contains pus and other material. An abscess can happen almost anywhere on your body. Some abscesses break open (rupture) on their own. Most continue to get worse unless they are treated. The infection can spread deeper into the body and into your blood, which can make you feel sick. Treatment usually involves draining the abscess.    Follow these instructions at home:  Abscess Care     If you have an abscess that has not drained, place a warm, clean, wet washcloth over the abscess several times a day. Do this as told by your doctor.  Follow instructions from your doctor about how to take care of your abscess. Make sure you:    Cover the abscess with a bandage (dressing).  Change your bandage or gauze as told by your doctor.  Wash your hands with soap and water before you change the bandage or gauze. If you cannot use soap and water, use hand .    Check your abscess every day for signs that the infection is getting worse. Check for:    More redness, swelling, or pain.  More fluid or blood.  Warmth.  More pus or a bad smell.    Medicines     Image   Take over-the-counter and prescription medicines only as told by your doctor.  If you were prescribed an antibiotic medicine, take it as told by your doctor. Do not stop taking the antibiotic even if you start to feel better.  General instructions     To avoid spreading the infection:    Do not share personal care items, towels, or hot tubs with others.  Avoid making skin-to-skin contact with other people.    Keep all follow-up visits as told by your doctor. This is important.  Contact a doctor if:  You have more redness, swelling, or pain around your abscess.  You have more fluid or blood coming from your abscess.  Your abscess feels warm when you touch it.  You have more pus or a bad smell coming from your abscess.  You have a fever.  Your muscles ache.  You have chills.  You feel sick.  Get help right away if:  You have very bad (severe) pain.  You see red streaks on your skin spreading away from the abscess.    Hyperglycemia  Hyperglycemia occurs when the level of sugar (glucose) in the blood is too high. Glucose is a type of sugar that provides the body's main source of energy. Certain hormones (insulin and glucagon) control the level of glucose in the blood. Insulin lowers blood glucose, and glucagon increases blood glucose. Hyperglycemia can result from having too little insulin in the bloodstream, or from the body not responding normally to insulin.    Hyperglycemia occurs most often in people who have diabetes (diabetes mellitus), but it can happen in people who do not have diabetes. It can develop quickly, and it can be life-threatening if it causes you to become severely dehydrated (diabetic ketoacidosis or hyperglycemic hyperosmolar state). Severe hyperglycemia is a medical emergency.    What are the causes?  If you have diabetes, hyperglycemia may be caused by:    Diabetes medicine.  Medicines that increase blood glucose or affect your diabetes control.  Not eating enough, or not eating often enough.  Changes in physical activity level.  Being sick or having an infection.    If you have prediabetes or undiagnosed diabetes:    Hyperglycemia may be caused by those conditions.    If you do not have diabetes, hyperglycemia may be caused by:    Certain medicines, including steroid medicines, beta-blockers, epinephrine, and thiazide diuretics.  Stress.  Serious illness.  Surgery.  Diseases of the pancreas.  Infection.    What increases the risk?  Hyperglycemia is more likely to develop in people who have risk factors for diabetes, such as:    Having a family member with diabetes.  Having a gene for type 1 diabetes that is passed from parent to child (inherited).  Living in an area with cold weather conditions.  Exposure to certain viruses.  Certain conditions in which the body's disease-fighting (immune) system attacks itself (autoimmune disorders).  Being overweight or obese.  Having an inactive (sedentary) lifestyle.  Having been diagnosed with insulin resistance.  Having a history of prediabetes, gestational diabetes, or polycystic ovarian syndrome (PCOS).  Being of American-[place]vian, -American, /, or / descent.    What are the signs or symptoms?  Hyperglycemia may not cause any symptoms. If you do have symptoms, they may include early warning signs, such as:    Increased thirst.  Hunger.  Feeling very tired.  Needing to urinate more often than usual.  Blurry vision.    Other symptoms may develop if hyperglycemia gets worse, such as:    Dry mouth.  Loss of appetite.  Fruity-smelling breath.  Weakness.  Unexpected or rapid weight gain or weight loss.  Tingling or numbness in the hands or feet.  Headache.  Skin that does not quickly return to normal after being lightly pinched and released (poor skin turgor).  Abdominal pain.  Cuts or bruises that are slow to heal.    How is this diagnosed?  Hyperglycemia is diagnosed with a blood test to measure your blood glucose level. This blood test is usually done while you are having symptoms. Your health care provider may also do a physical exam and review your medical history.    You may have more tests to determine the cause of your hyperglycemia, such as:    A fasting blood glucose (FBG) test. You will not be allowed to eat (you will fast) for at least 8 hours before a blood sample is taken.  An A1c (hemoglobin A1c) blood test. This provides information about blood glucose control over the previous 2–3 months.  An oral glucose tolerance test (OGTT). This measures your blood glucose at two times:    After fasting. This is your baseline blood glucose level.  Two hours after drinking a beverage that contains glucose.      How is this treated?  Treatment depends on the cause of your hyperglycemia. Treatment may include:    Taking medicine to regulate your blood glucose levels. If you take insulin or other diabetes medicines, your medicine or dosage may be adjusted.  Lifestyle changes, such as exercising more, eating healthier foods, or losing weight.  Treating an illness or infection, if this caused your hyperglycemia.  Checking your blood glucose more often.  Stopping or reducing steroid medicines, if these caused your hyperglycemia.    If your hyperglycemia becomes severe and it results in hyperglycemic hyperosmolar state, you must be hospitalized and given IV fluids.    Follow these instructions at home:  General instructions     Take over-the-counter and prescription medicines only as told by your health care provider.  Do not use any products that contain nicotine or tobacco, such as cigarettes and e-cigarettes. If you need help quitting, ask your health care provider.  Limit alcohol intake to no more than 1 drink per day for nonpregnant women and 2 drinks per day for men. One drink equals 12 oz of beer, 5 oz of wine, or 1½ oz of hard liquor.  Learn to manage stress. If you need help with this, ask your health care provider.  Keep all follow-up visits as told by your health care provider. This is important.  Eating and drinking     Maintain a healthy weight.  Exercise regularly, as directed by your health care provider.  Stay hydrated, especially when you exercise, get sick, or spend time in hot temperatures.  Eat healthy foods, such as:    Lean proteins.  Complex carbohydrates.  Fresh fruits and vegetables.  Low-fat dairy products.  Healthy fats.    ImageDrink enough fluid to keep your urine clear or pale yellow.  If you have diabetes:     Image   Make sure you know the symptoms of hyperglycemia.  Follow your diabetes management plan, as told by your health care provider. Make sure you:    Take your insulin and medicines as directed.  Follow your exercise plan.  Follow your meal plan. Eat on time, and do not skip meals.  Check your blood glucose as often as directed. Make sure to check your blood glucose before and after exercise. If you exercise longer or in a different way than usual, check your blood glucose more often.  Follow your sick day plan whenever you cannot eat or drink normally. Make this plan in advance with your health care provider.    Share your diabetes management plan with people in your workplace, school, and household.  Check your urine for ketones when you are ill and as told by your health care provider.  Carry a medical alert card or wear medical alert jewelry.  Contact a health care provider if:  Your blood glucose is at or above 240 mg/dL (13.3 mmol/L) for 2 days in a row.  You have problems keeping your blood glucose in your target range.  You have frequent episodes of hyperglycemia.  Get help right away if:  You have difficulty breathing.  You have a change in how you think, feel, or act (mental status).  You have nausea or vomiting that does not go away.  These symptoms may represent a serious problem that is an emergency. Do not wait to see if the symptoms will go away. Get medical help right away. Call your local emergency services (911 in the [place] Do not drive yourself to the hospital.     Summary  Hyperglycemia occurs when the level of sugar (glucose) in the blood is too high.  Hyperglycemia is diagnosed with a blood test to measure your blood glucose level. This blood test is usually done while you are having symptoms. Your health care provider may also do a physical exam and review your medical history.  If you have diabetes, follow your diabetes management plan as told by your health care provider.  Contact your health care provider if you have problems keeping your blood glucose in your target range.  This information is not intended to replace advice given to you by your health care provider. Make sure you discuss any questions you have with your health care provider.    Hypertension    WHAT YOU NEED TO KNOW:    Hypertension is high blood pressure. Your blood pressure is the force of your blood moving against the walls of your arteries. Hypertension causes your blood pressure to get so high that your heart has to work much harder than normal. This can damage your heart. The cause of hypertension may not be known. This is called essential or primary hypertension. Hypertension caused by another medical condition, such as kidney disease, is called secondary hypertension.    DISCHARGE INSTRUCTIONS:    Call 911 for any of the following:     You have chest pain.      You have any of the following signs of a heart attack:   Squeezing, pressure, or pain in your chest       and any of the following:   Discomfort or pain in your back, neck, jaw, stomach, or arm       Shortness of breath      Nausea or vomiting      Lightheadedness or a sudden cold sweat      You become confused or have difficulty speaking.      You suddenly feel lightheaded or have trouble breathing.    Return to the emergency department if:     You have a severe headache or vision loss.      You have weakness in an arm or leg.     Contact your healthcare provider if:     You feel faint, dizzy, confused, or drowsy.       You have been taking your blood pressure medicine but your pressure is higher than your provider says it should be.      You have questions or concerns about your condition or care.    Medicines: You may need any of the following:     Antihypertensives may be used to help lower your blood pressure. Several kinds of medicines are available. Your healthcare provider will choose medicines based on the kind of hypertension you have. You may need more than one type of medicine. Take the medicine exactly as directed.       Diuretics help decrease extra fluid that collects in your body. This will help lower your blood pressure. You may urinate more often while you take this medicine.      Cholesterol medicine helps lower your cholesterol level. A low cholesterol level helps prevent heart disease and makes it easier to control your blood pressure.       Take your medicine as directed. Contact your healthcare provider if you think your medicine is not helping or if you have side effects. Tell him or her if you are allergic to any medicine. Keep a list of the medicines, vitamins, and herbs you take. Include the amounts, and when and why you take them. Bring the list or the pill bottles to follow-up visits. Carry your medicine list with you in case of an emergency.    Follow up with your healthcare provider as directed: You will need to return to have your blood pressure checked and to have other lab tests done. Write down your questions so you remember to ask them during your visits.     Stages of hypertension: Blood Pressure Readings         Normal blood pressure is 119/79 or lower. Your healthcare provider may only check your blood pressure each year if it stays at a normal level.      Elevated blood pressure is 120/79 to 129/79. This is sometimes called prehypertension. Your healthcare provider may suggest lifestyle changes to help lower your blood pressure to a normal level. He or she may then check it again in 3 to 6 months.      Stage 1 hypertension is 130/80 to 139/89. Your provider may recommend lifestyle changes, medication, and checks every 3 to 6 months until your blood pressure is controlled.      Stage 2 hypertension is 140/90 or higher. Your provider will recommend lifestyle changes and have you take 2 kinds of hypertension medicines. You will also need to have your blood pressure checked monthly until it is controlled.    Manage hypertension:     Check your blood pressure at home. Avoid smoking, caffeine, and exercise at least 30 minutes before checking your blood pressure. Sit and rest for 5 minutes before you take your blood pressure. Extend your arm and support it on a flat surface. Your arm should be at the same level as your heart. Follow the directions that came with your blood pressure monitor. Check your blood pressure 2 times, 1 minute apart, before you take your medicine in the morning. Also check your blood pressure before your evening meal. Keep a record of your readings and bring it to your follow-up visits. Ask your healthcare provider what your blood pressure should be. How to take a Blood Pressure           Manage any other health conditions you have. Health conditions such as diabetes can increase your risk for hypertension. Follow your healthcare provider's instructions and take all your medicines as directed.       Ask about all medicines. Certain medicines can increase your blood pressure. Examples include oral birth control pills, decongestants, herbal supplements, and NSAIDs, such as ibuprofen. Your healthcare provider can tell you which medicines are safe for you to take. This includes prescription and over-the-counter medicines.    Lifestyle changes you can make to manage hypertension:     Limit sodium (salt) as directed. Too much sodium can affect your fluid balance. Check labels to find low-sodium or no-salt-added foods. Some low-sodium foods use potassium salts for flavor. Too much potassium can also cause health problems. Your healthcare provider will tell you how much sodium and potassium are safe for you to have in a day. He or she may recommend that you limit sodium to 2,300 mg a day.           Follow the meal plan recommended by your healthcare provider. A dietitian or your provider can give you more information on low-sodium plans or the DASH (Dietary Approaches to Stop Hypertension) eating plan. The DASH plan is low in sodium, unhealthy fats, and total fat. It is high in potassium, calcium, and fiber.            Exercise to maintain a healthy weight. Exercise at least 30 minutes per day, on most days of the week. This will help decrease your blood pressure. Ask your healthcare provider about the best exercise plan for you. Walking for Exercise           Decrease stress. This may help lower your blood pressure. Learn ways to relax, such as deep breathing or listening to music.      Limit alcohol as directed. Alcohol can increase your blood pressure. A drink of alcohol is 12 ounces of beer, 5 ounces of wine, or 1½ ounces of liquor.      Do not smoke. Nicotine and other chemicals in cigarettes and cigars can increase your blood pressure and also cause lung damage. Ask your healthcare provider for information if you currently smoke and need help to quit. E-cigarettes or smokeless tobacco still contain nicotine. Talk to your healthcare provider before you use these products.

## 2020-11-16 NOTE — ED STATDOCS - OBJECTIVE STATEMENT
36 yo pt presents with pain to right hand.  Pt started with small "pimple" on right hand nov 12.  Over the next couple of days area become more painful and more red.  Pt with a lot of pain if trying to make s fist.  Pt had virtual visit today and instructed to go to ED.  No fever, no travel, pt works in [place] [employer].  Pt is right handed.

## 2020-11-16 NOTE — ED ADULT NURSE NOTE - OBJECTIVE STATEMENT
pt presents to the ED with right hand cellulitis/injections. pt states that she noticed a dot on her hand a few days ago which grew in size. increasing pain. denies fevers, chills.

## 2020-11-16 NOTE — ED STATDOCS - PROGRESS NOTE DETAILS
Pt. presents with swelling to right hand x 3 days.  Pt. soaking hand at home.  Neg. itch or trauma but noticed a small pimple to area prior to swelling.  Now with pain when moving fingers.  STS noted to dorsum of right hand without finger involvement.  Centralized necrosis.  Neg. fluctuance.  Indurated to touch.   Neg. lymphangitis.  Will obtain labs, IV ABX, hand consult.  Brandy Joshua PA-C Noted elevated BP.  Pt. has not taken BP meds in two days.  Will order as she is on Labetalol and Losartan.  Brandy Joshua PA-C I&D performed by orthopedic resident, Dr. Hillman who sent wound Cx.   To be DC with pain management and Keflex/Bactrim.  Informed patient that she must take her HTN medications daily.  Brandy Joshua PA-C

## 2020-11-16 NOTE — ED STATDOCS - CARE PROVIDER_API CALL
Rose Kendrick  [facility]  [address] [place] [address]  Phone: [phone]  Fax: [phone]  Follow Up Time:

## 2020-11-18 ENCOUNTER — APPOINTMENT (OUTPATIENT)
Dept: ORTHOPEDIC SURGERY | Facility: CLINIC | Age: 35
End: 2020-11-18
Payer: COMMERCIAL

## 2020-11-18 VITALS
SYSTOLIC BLOOD PRESSURE: 178 MMHG | HEART RATE: 101 BPM | DIASTOLIC BLOOD PRESSURE: 93 MMHG | HEIGHT: 65 IN | WEIGHT: 293 LBS | BODY MASS INDEX: 48.82 KG/M2

## 2020-11-18 LAB
-  AMPICILLIN/SULBACTAM: SIGNIFICANT CHANGE UP
-  CEFAZOLIN: SIGNIFICANT CHANGE UP
-  CLINDAMYCIN: SIGNIFICANT CHANGE UP
-  ERYTHROMYCIN: SIGNIFICANT CHANGE UP
-  GENTAMICIN: SIGNIFICANT CHANGE UP
-  OXACILLIN: SIGNIFICANT CHANGE UP
-  PENICILLIN: SIGNIFICANT CHANGE UP
-  RIFAMPIN: SIGNIFICANT CHANGE UP
-  TETRACYCLINE: SIGNIFICANT CHANGE UP
-  TRIMETHOPRIM/SULFAMETHOXAZOLE: SIGNIFICANT CHANGE UP
-  VANCOMYCIN: SIGNIFICANT CHANGE UP
METHOD TYPE: SIGNIFICANT CHANGE UP

## 2020-11-18 PROCEDURE — 99204 OFFICE O/P NEW MOD 45 MIN: CPT

## 2020-11-18 RX ORDER — OXYCODONE AND ACETAMINOPHEN 5; 325 MG/1; MG/1
5-325 TABLET ORAL EVERY 4 HOURS
Qty: 30 | Refills: 0 | Status: ACTIVE | COMMUNITY
Start: 2020-11-18 | End: 1900-01-01

## 2020-11-18 NOTE — PHYSICAL EXAM
[Normal Finger/nose] : finger to nose coordination [Normal] : no peripheral adenopathy appreciated [de-identified] : right hand:\par 3 x 3 cm area of swelling and erythema small skin defect in the center of the swelling packing removed,mild tenderness discharge, no purulence\par Range of motion of the digits slightly limited secondary to pain and swelling\par Neurovascularly intact distally [de-identified] : jeovanyr

## 2020-11-18 NOTE — ASSESSMENT
[FreeTextEntry1] : 35 female status post bedside I&D of a right hand abscess on Monday at [facility]. She is improving clinically on oral antibiotics. I recommend daily care with normal showering and 5 minute soaks with dilute peroxide. Follow up in one week for a wound check.

## 2020-11-18 NOTE — CONSULT LETTER
[Consult Letter:] : I had the pleasure of evaluating your patient, [unfilled]. [Please see my note below.] : Please see my note below. [Consult Closing:] : Thank you very much for allowing me to participate in the care of this patient.  If you have any questions, please do not hesitate to contact me. [Sincerely,] : Sincerely, [FreeTextEntry3] : Dr. Rose Kendrick\par Orthopaedic Surgery\par Hand & Upper Extremity.\par

## 2020-11-18 NOTE — HISTORY OF PRESENT ILLNESS
[FreeTextEntry1] : 35 female presents for followup of right hand abscess I&D.she was seen at [facility] on Monday and diagnosed with aright dorsal hand abscess. Bedside I&D was performed and packing was placed in the wound. She was discharged on Bactrim and Keflex, cultures were positive for staph aureus.She complains of pain in the hand that has improved since Monday.

## 2020-11-20 RX ORDER — METFORMIN HYDROCHLORIDE 500 MG/1
500 TABLET, COATED ORAL
Qty: 360 | Refills: 0 | Status: ACTIVE | COMMUNITY
Start: 2018-10-11 | End: 1900-01-01

## 2020-11-20 RX ORDER — LOSARTAN POTASSIUM AND HYDROCHLOROTHIAZIDE 12.5; 1 MG/1; MG/1
100-12.5 TABLET ORAL
Qty: 90 | Refills: 0 | Status: ACTIVE | COMMUNITY
Start: 2019-12-03 | End: 1900-01-01

## 2020-11-21 LAB
CULTURE RESULTS: SIGNIFICANT CHANGE UP
ORGANISM # SPEC MICROSCOPIC CNT: SIGNIFICANT CHANGE UP
ORGANISM # SPEC MICROSCOPIC CNT: SIGNIFICANT CHANGE UP
SPECIMEN SOURCE: SIGNIFICANT CHANGE UP

## 2020-11-22 LAB
CULTURE RESULTS: SIGNIFICANT CHANGE UP
CULTURE RESULTS: SIGNIFICANT CHANGE UP
SPECIMEN SOURCE: SIGNIFICANT CHANGE UP
SPECIMEN SOURCE: SIGNIFICANT CHANGE UP

## 2020-11-24 ENCOUNTER — APPOINTMENT (OUTPATIENT)
Dept: ORTHOPEDIC SURGERY | Facility: CLINIC | Age: 35
End: 2020-11-24
Payer: COMMERCIAL

## 2020-11-24 DIAGNOSIS — L08.9 LOCAL INFECTION OF THE SKIN AND SUBCUTANEOUS TISSUE, UNSPECIFIED: ICD-10-CM

## 2020-11-24 PROCEDURE — 99024 POSTOP FOLLOW-UP VISIT: CPT

## 2020-11-24 NOTE — PHYSICAL EXAM
[Normal Finger/nose] : finger to nose coordination [Normal] : no peripheral adenopathy appreciated [de-identified] : right hand:\par 3 x 3 cm area of swelling with no erythema small skin defect in the center with a healing small 2 mm wound to the dorsum of the hand, no tenderness no discharge, no purulence\par Range of motion of the digits is fully intact\par Neurovascularly intact distally [de-identified] : jeovanyr

## 2020-11-24 NOTE — HISTORY OF PRESENT ILLNESS
[FreeTextEntry1] : 11/18/20: 35 female presents for followup of right hand abscess I&D.she was seen at [facility] on Monday and diagnosed with aright dorsal hand abscess. Bedside I&D was performed and packing was placed in the wound. She was discharged on Bactrim and Keflex, cultures were positive for staph aureus.She complains of pain in the hand that has improved since Monday.\par \par 11/24/2020: The patient returns to the office today about 1-1/2 weeks after a bedside I&D of the dorsum of her right hand. Overall she is doing well and has been continuing her antibiotics. She has no signs of infection. She denies fevers or chills. She states that her hand pain has completely improved.

## 2020-11-24 NOTE — ASSESSMENT
[FreeTextEntry1] : Dressing change today per the patient. She is seeing significant healing of the dorsum of her hand. I recommend keeping the wound as dry as possible and daily washing with warm soap and water. She may cover her wound if she is going out. Otherwise I would prefer that she keep it open to the air. She should continue to finish her antibiotics. She is moving to [place] next week and may not be able to follow back up. Signs and symptoms of infection were discussed at length with the patient and she was told to go to her local ER should she see any of these signs and symptoms. The patient is in agreement with this plan and appreciative of her care.

## 2020-11-25 ENCOUNTER — APPOINTMENT (OUTPATIENT)
Dept: INTERNAL MEDICINE | Facility: CLINIC | Age: 35
End: 2020-11-25

## 2020-12-10 ENCOUNTER — APPOINTMENT (OUTPATIENT)
Dept: ENDOCRINOLOGY | Facility: CLINIC | Age: 35
End: 2020-12-10

## 2020-12-15 ENCOUNTER — RX RENEWAL (OUTPATIENT)
Age: 35
End: 2020-12-15

## 2020-12-15 RX ORDER — ATORVASTATIN CALCIUM 10 MG/1
10 TABLET, FILM COATED ORAL DAILY
Qty: 90 | Refills: 0 | Status: ACTIVE | COMMUNITY
Start: 2020-08-23 | End: 1900-01-01

## 2021-02-26 NOTE — ED ADULT TRIAGE NOTE - PAIN: PRESENCE, MLM
OFFICE VISIT      Patient: aMy Marrufo Date of Service: 2021   : 1977 MRN: [id number]     TB PLACEMENT     Chief Complaint   Patient presents with   • PPD Placement       ALLERGIES:   Allergen Reactions   • Kiwi   (Food Or Med) PRURITUS         Follow up:  PPD placed on the left forearm on 21 at 12pm. You need to come back within 48-72 hrs to the clinic during clinic hours for TB skin test results. If you do not return within 72 hrs, you will probably need to be rescheduled for another skin test per CDC guidelines. Pt verbalizes understanding.    Patient Instructions provided as documented in the AVS.    The patient indicated understanding of the diagnosis and agreed with the plan of care.      Sima Stack, NABILA, NP-C             complains of pain/discomfort

## 2022-04-11 NOTE — ED ADULT NURSE NOTE - NSSISCREENINGQ4_ED_A_ED
Problem: MOBILITY - ADULT  Goal: Maintain or return to baseline ADL function  Description: INTERVENTIONS:  -  Assess patient's ability to carry out ADLs; assess patient's baseline for ADL function and identify physical deficits which impact ability to perform ADLs (bathing, care of mouth/teeth, toileting, grooming, dressing, etc )  - Assess/evaluate cause of self-care deficits   - Assess range of motion  - Assess patient's mobility; develop plan if impaired  - Assess patient's need for assistive devices and provide as appropriate  - Encourage maximum independence but intervene and supervise when necessary  - Involve family in performance of ADLs  - Assess for home care needs following discharge   - Consider OT consult to assist with ADL evaluation and planning for discharge  - Provide patient education as appropriate  Outcome: Progressing  Goal: Maintains/Returns to pre admission functional level  Description: INTERVENTIONS:  - Perform BMAT or MOVE assessment daily    - Set and communicate daily mobility goal to care team and patient/family/caregiver  - Collaborate with rehabilitation services on mobility goals if consulted  - Perform Range of Motion 3 times a day  - Reposition patient every 2 hours    - Dangle patient 3 times a day  - Stand patient 3 times a day  - Ambulate patient 3 times a day  - Out of bed to chair 3 times a day   - Out of bed for meals 3 times a day  - Out of bed for toileting  - Record patient progress and toleration of activity level   Outcome: Progressing     Problem: Potential for Falls  Goal: Patient will remain free of falls  Description: INTERVENTIONS:  - Educate patient/family on patient safety including physical limitations  - Instruct patient to call for assistance with activity   - Consult OT/PT to assist with strengthening/mobility   - Keep Call bell within reach  - Keep bed low and locked with side rails adjusted as appropriate  - Keep care items and personal belongings within reach  - Initiate and maintain comfort rounds  - Make Fall Risk Sign visible to staff  - Offer Toileting every 2 Hours, in advance of need  - Initiate/Maintain bed alarm  - Obtain necessary fall risk management equipment:   - Apply yellow socks and bracelet for high fall risk patients  - Consider moving patient to room near nurses station  Outcome: Progressing     Problem: Prexisting or High Potential for Compromised Skin Integrity  Goal: Skin integrity is maintained or improved  Description: INTERVENTIONS:  - Identify patients at risk for skin breakdown  - Assess and monitor skin integrity  - Assess and monitor nutrition and hydration status  - Monitor labs   - Assess for incontinence   - Turn and reposition patient  - Assist with mobility/ambulation  - Relieve pressure over bony prominences  - Avoid friction and shearing  - Provide appropriate hygiene as needed including keeping skin clean and dry  - Evaluate need for skin moisturizer/barrier cream  - Collaborate with interdisciplinary team   - Patient/family teaching  - Consider wound care consult   Outcome: Progressing     Problem: Nutrition/Hydration-ADULT  Goal: Nutrient/Hydration intake appropriate for improving, restoring or maintaining nutritional needs  Description: Monitor and assess patient's nutrition/hydration status for malnutrition  Collaborate with interdisciplinary team and initiate plan and interventions as ordered  Monitor patient's weight and dietary intake as ordered or per policy  Utilize nutrition screening tool and intervene as necessary  Determine patient's food preferences and provide high-protein, high-caloric foods as appropriate       INTERVENTIONS:  - Monitor oral intake, urinary output, labs, and treatment plans  - Assess nutrition and hydration status and recommend course of action  - Evaluate amount of meals eaten  - Assist patient with eating if necessary   - Allow adequate time for meals  - Recommend/ encourage appropriate diets, oral nutritional supplements, and vitamin/mineral supplements  - Order, calculate, and assess calorie counts as needed  - Recommend, monitor, and adjust tube feedings and TPN/PPN based on assessed needs  - Assess need for intravenous fluids  - Provide specific nutrition/hydration education as appropriate  - Include patient/family/caregiver in decisions related to nutrition  Outcome: Progressing     Problem: PAIN - ADULT  Goal: Verbalizes/displays adequate comfort level or baseline comfort level  Description: Interventions:  - Encourage patient to monitor pain and request assistance  - Assess pain using appropriate pain scale  - Administer analgesics based on type and severity of pain and evaluate response  - Implement non-pharmacological measures as appropriate and evaluate response  - Consider cultural and social influences on pain and pain management  - Notify physician/advanced practitioner if interventions unsuccessful or patient reports new pain  Outcome: Progressing     Problem: GASTROINTESTINAL - ADULT  Goal: Minimal or absence of nausea and/or vomiting  Description: INTERVENTIONS:  - Administer IV fluids if ordered to ensure adequate hydration  - Maintain NPO status until nausea and vomiting are resolved  - Nasogastric tube if ordered  - Administer ordered antiemetic medications as needed  - Provide nonpharmacologic comfort measures as appropriate  - Advance diet as tolerated, if ordered  - Consider nutrition services referral to assist patient with adequate nutrition and appropriate food choices  Outcome: Progressing  Goal: Maintains or returns to baseline bowel function  Description: INTERVENTIONS:  - Assess bowel function  - Encourage oral fluids to ensure adequate hydration  - Administer IV fluids if ordered to ensure adequate hydration  - Administer ordered medications as needed  - Encourage mobilization and activity  - Consider nutritional services referral to assist patient with adequate nutrition and appropriate food choices  Outcome: Progressing  Goal: Maintains adequate nutritional intake  Description: INTERVENTIONS:  - Monitor percentage of each meal consumed  - Identify factors contributing to decreased intake, treat as appropriate  - Assist with meals as needed  - Monitor I&O, weight, and lab values if indicated  - Obtain nutrition services referral as needed  Outcome: Progressing  Goal: Oral mucous membranes remain intact  Description: INTERVENTIONS  - Assess oral mucosa and hygiene practices  - Implement preventative oral hygiene regimen  - Implement oral medicated treatments as ordered  - Initiate Nutrition services referral as needed  Outcome: Progressing No

## 2022-07-19 NOTE — CURRENT MEDS
Service to cardiology for pulmonary HTN from PCP. Recent echo disclosed a normal EF and severe pulmonary HTN. Echo 02/28/2022  Final Impressions  Normal left ventricular chamber size. Normal left ventricular systolic function. Left ventricular ejection fraction, 55-60%. No left ventricular regional wall motion abnormalities. Flattening of the septum in systole, consistent with right ventricular pressure overload. Mild-moderately increased right ventricular chamber size. Normal right ventricular systolic function. Severely increased left atrial chamber size. Severely increased right atrial chamber size. Moderate tricuspid valve regurgitation. Mild-moderate mitral valve regurgitation. Mild pulmonary valve regurgitation. Severe pulmonary hypertension, RVSP 59 mmHg. Trivial pericardial effusion, adjacent to the atria. Compared to the TTE performed on 4/9/2014, the current study has worsened. The RV is now mild-moderately enlarged, estimated  RVSP has slightly worsened and a trivial pericardial effusion is now present. [Takes medication as prescribed] : takes

## 2022-08-02 NOTE — ED STATDOCS - NSTIMEPROVIDERCAREINITIATE_GEN_ER
Quality 110: Preventive Care And Screening: Influenza Immunization: Influenza Immunization previously received during influenza season
Quality 226: Preventive Care And Screening: Tobacco Use: Screening And Cessation Intervention: Patient screened for tobacco use and is an ex/non-smoker
Detail Level: Detailed
16-Nov-2020 19:28

## 2023-01-11 NOTE — ASU PATIENT PROFILE, ADULT - ATTEMPT TO OOB
Detail Level: Detailed Render Risk Assessment In Note?: no Note Text (......Xxx Chief Complaint.): This diagnosis correlates with the no

## 2023-07-10 NOTE — REVIEW OF SYSTEMS
Yanely Wright is a 83 year old female presenting for   Chief Complaint   Patient presents with   • Weakness     Denies Latex allergy or sensitivity.    Medication verified and med list updated  Refills needed today: No    Health Maintenance Due   Topic Date Due   • Shingles Vaccine (2 of 3) 07/10/2007   • DTaP/Tdap/Td Vaccine (1 - Tdap) 06/15/2010               Last lab results:   No results found for: HGBA1C  Cholesterol (mg/dL)   Date Value   04/13/2023 179     HDL (mg/dL)   Date Value   04/13/2023 57     Triglycerides (mg/dL)   Date Value   04/13/2023 94     LDL (mg/dL)   Date Value   04/13/2023 103     No results found for: URMIC, UCR, MALBCR  No results found for: IFOB               Depression Screening:  Recent Review Flowsheet Data     Date 6/22/2023    Adult PHQ 2 Score 0    Adult PHQ 2 Interpretation No further screening needed    Little interest or pleasure in activity? Not at all    Feeling down, depressed or hopeless? Not at all           Advance Directives:  on file   [Negative] : Heme/Lymph

## 2024-05-16 NOTE — PROGRESS NOTE ADULT - ASSESSMENT
[Mother] : mother  Section Day POD 1  33y yo  s/p scheduled  with hx of prior c/s due to arrest of labor.  She is doing well.     - Continue the current pain medication.  - Zofran PRN for nausea, will determine need for stool softener if no flatus/BM over next day  - Encourage ambulation and regular diet.  - DVT ppx: ambulation and heparin 5000 units SubQ q12h  - She is stable, tolerates a diet and has normal bowel function.  - She will be discharged on Day 3 or 4 according to the normal criteria.  - pt refusing circumcision for male infant at this time.